# Patient Record
Sex: FEMALE | Race: WHITE | NOT HISPANIC OR LATINO | Employment: FULL TIME | URBAN - METROPOLITAN AREA
[De-identification: names, ages, dates, MRNs, and addresses within clinical notes are randomized per-mention and may not be internally consistent; named-entity substitution may affect disease eponyms.]

---

## 2023-04-20 LAB
ALANINE AMINOTRANSFERASE (SGPT) (U/L) IN SER/PLAS: 31 U/L (ref 7–45)
ALBUMIN (G/DL) IN SER/PLAS: 3.8 G/DL (ref 3.4–5)
ALKALINE PHOSPHATASE (U/L) IN SER/PLAS: 83 U/L (ref 33–110)
ANION GAP IN SER/PLAS: 12 MMOL/L (ref 10–20)
ASPARTATE AMINOTRANSFERASE (SGOT) (U/L) IN SER/PLAS: 20 U/L (ref 9–39)
BILIRUBIN TOTAL (MG/DL) IN SER/PLAS: 0.3 MG/DL (ref 0–1.2)
CALCIDIOL (25 OH VITAMIN D3) (NG/ML) IN SER/PLAS: 71 NG/ML
CALCIUM (MG/DL) IN SER/PLAS: 8.9 MG/DL (ref 8.6–10.6)
CARBON DIOXIDE, TOTAL (MMOL/L) IN SER/PLAS: 27 MMOL/L (ref 21–32)
CHLORIDE (MMOL/L) IN SER/PLAS: 103 MMOL/L (ref 98–107)
CHOLESTEROL (MG/DL) IN SER/PLAS: 178 MG/DL (ref 0–199)
CHOLESTEROL IN HDL (MG/DL) IN SER/PLAS: 35.5 MG/DL
CHOLESTEROL/HDL RATIO: 5
COBALAMIN (VITAMIN B12) (PG/ML) IN SER/PLAS: 428 PG/ML (ref 211–911)
CREATININE (MG/DL) IN SER/PLAS: 0.59 MG/DL (ref 0.5–1.05)
ESTIMATED AVERAGE GLUCOSE FOR HBA1C: 126 MG/DL
GFR FEMALE: >90 ML/MIN/1.73M2
GLUCOSE (MG/DL) IN SER/PLAS: 101 MG/DL (ref 74–99)
HEMOGLOBIN A1C/HEMOGLOBIN TOTAL IN BLOOD: 6 %
INSULIN, FASTING: 32 UIU/ML (ref 3–25)
LDL: 114 MG/DL (ref 0–99)
POTASSIUM (MMOL/L) IN SER/PLAS: 4.3 MMOL/L (ref 3.5–5.3)
PROTEIN TOTAL: 7.3 G/DL (ref 6.4–8.2)
SODIUM (MMOL/L) IN SER/PLAS: 138 MMOL/L (ref 136–145)
TRIGLYCERIDE (MG/DL) IN SER/PLAS: 142 MG/DL (ref 0–149)
UREA NITROGEN (MG/DL) IN SER/PLAS: 11 MG/DL (ref 6–23)
VLDL: 28 MG/DL (ref 0–40)

## 2023-05-16 ENCOUNTER — OFFICE VISIT (OUTPATIENT)
Dept: PRIMARY CARE | Facility: CLINIC | Age: 43
End: 2023-05-16
Payer: COMMERCIAL

## 2023-05-16 VITALS
DIASTOLIC BLOOD PRESSURE: 72 MMHG | SYSTOLIC BLOOD PRESSURE: 116 MMHG | WEIGHT: 236 LBS | BODY MASS INDEX: 43.43 KG/M2 | HEIGHT: 62 IN

## 2023-05-16 DIAGNOSIS — M25.511 STERNOCLAVICULAR JOINT PAIN, RIGHT: Primary | ICD-10-CM

## 2023-05-16 DIAGNOSIS — Z00.00 HEALTHCARE MAINTENANCE: ICD-10-CM

## 2023-05-16 PROBLEM — H66.92 LEFT OTITIS MEDIA: Status: ACTIVE | Noted: 2023-05-16

## 2023-05-16 PROBLEM — H65.111 ACUTE ALLERGIC OTITIS MEDIA OF RIGHT EAR: Status: ACTIVE | Noted: 2023-05-16

## 2023-05-16 PROBLEM — J32.1 FRONTAL SINUSITIS: Status: ACTIVE | Noted: 2023-05-16

## 2023-05-16 PROBLEM — D64.9 ANEMIA: Status: ACTIVE | Noted: 2023-05-16

## 2023-05-16 PROBLEM — D75.839 THROMBOCYTOSIS: Status: ACTIVE | Noted: 2023-05-16

## 2023-05-16 PROBLEM — E78.5 HYPERLIPIDEMIA: Status: ACTIVE | Noted: 2023-05-16

## 2023-05-16 PROBLEM — J32.9 SINUSITIS: Status: ACTIVE | Noted: 2023-05-16

## 2023-05-16 PROBLEM — J30.9 ALLERGIC RHINOSINUSITIS: Status: ACTIVE | Noted: 2023-05-16

## 2023-05-16 PROBLEM — D47.3 ESSENTIAL THROMBOCYTOSIS (MULTI): Status: ACTIVE | Noted: 2023-05-16

## 2023-05-16 PROBLEM — J01.10 ACUTE FRONTAL SINUSITIS: Status: ACTIVE | Noted: 2023-05-16

## 2023-05-16 PROBLEM — J02.9 SORE THROAT: Status: ACTIVE | Noted: 2023-05-16

## 2023-05-16 PROBLEM — J01.90 ACUTE SINUSITIS: Status: ACTIVE | Noted: 2023-05-16

## 2023-05-16 PROCEDURE — 3008F BODY MASS INDEX DOCD: CPT | Performed by: INTERNAL MEDICINE

## 2023-05-16 PROCEDURE — 99213 OFFICE O/P EST LOW 20 MIN: CPT | Performed by: INTERNAL MEDICINE

## 2023-05-16 RX ORDER — SPIRONOLACTONE 100 MG/1
TABLET, FILM COATED ORAL
COMMUNITY
Start: 2015-10-23

## 2023-05-16 RX ORDER — CYPROHEPTADINE HYDROCHLORIDE 4 MG/1
1 TABLET ORAL 2 TIMES DAILY
COMMUNITY
Start: 2018-08-08

## 2023-05-16 RX ORDER — FLUTICASONE PROPIONATE 93 UG/1
SPRAY, METERED NASAL
COMMUNITY
Start: 2018-11-19 | End: 2023-09-28 | Stop reason: ALTCHOICE

## 2023-05-16 RX ORDER — AZELASTINE 1 MG/ML
SPRAY, METERED NASAL
COMMUNITY
Start: 2023-02-27

## 2023-05-16 RX ORDER — MONTELUKAST SODIUM 10 MG/1
10 TABLET ORAL
COMMUNITY

## 2023-05-17 NOTE — PROGRESS NOTES
"Subjective   Patient ID: Barbara Santiaog is a 43 y.o. female who presents for swelling neck.    HPI   Patient presents with complaints of right-sided neck pain and tenderness for last 2 days  Denies any sore throat denies any chest pain  She was working in the yard 3 days ago doing a lot of mulching and lift heavy bags      Past recap  Patient is here for physical  Follow-up on anemia  Also mother passed away so she was under a lot of stress and not eating well and gaining weight  Having hard time losing weight with diet and exercise  Wants some consultation regarding weight loss diet pill        To establish PCP  For physical  Just questions about taking famotidine at night  She was waking up in the middle of the night so her allergist immunologist suggested to take famotidine and it has cut down her mid night awakening and cough     Past medical history: Seasonal allergies, food allergies, immune deficiency, acne, restless leg, tonsillectomy  Social history: Non-smoker nondrinker, high   Family history Father with type 2 diabetes mother with obesity heart failure MRSA infection after spinal surgery, paternal grandmother with breast cancer and osteoporosis   Review of Systems    Objective   /72   Ht 1.575 m (5' 2\")   Wt 107 kg (236 lb)   BMI 43.16 kg/m²     Physical Exam  Vitals reviewed.   Constitutional:       Appearance: Normal appearance.   HENT:      Head: Normocephalic and atraumatic.      Right Ear: Tympanic membrane, ear canal and external ear normal.      Left Ear: Tympanic membrane, ear canal and external ear normal.      Nose: Nose normal.      Mouth/Throat:      Pharynx: Oropharynx is clear.   Eyes:      Extraocular Movements: Extraocular movements intact.      Conjunctiva/sclera: Conjunctivae normal.      Pupils: Pupils are equal, round, and reactive to light.   Cardiovascular:      Rate and Rhythm: Normal rate and regular rhythm.      Pulses: Normal pulses.      " Heart sounds: Normal heart sounds.   Pulmonary:      Effort: Pulmonary effort is normal.      Breath sounds: Normal breath sounds.   Abdominal:      General: Abdomen is flat. Bowel sounds are normal.      Palpations: Abdomen is soft.   Musculoskeletal:         General: Tenderness present.      Cervical back: Normal range of motion and neck supple.      Comments: Tenderness at the sternoclavicular joint on right side   Skin:     General: Skin is warm and dry.   Neurological:      General: No focal deficit present.      Mental Status: She is alert and oriented to person, place, and time.   Psychiatric:         Mood and Affect: Mood normal.         Assessment/Plan   Problem List Items Addressed This Visit    None  Visit Diagnoses       Sternoclavicular joint pain, right    -  Primary    Healthcare maintenance        Relevant Orders    CT cardiac scoring wo IV contrast                Past recap  Physical normal  Routine blood work ordered  CT cardiac scoring ordered to assess the risk factors  Discussed lifestyle modification to help with the symptoms of GERD  Iron studies and B12 ordered for anemia  Patient is under care of Dr. Albert for acne  She is under care of allergist for immune deficiency  Again discussed different options for weight loss strategies diet exercise  Will refer to weight management clinic  Discussed possibility of sleep apnea causing the symptoms patient wants to lose weight first and see if she still needs sleep study     5/16/2023  Patient has tenderness in sternoclavicular joint  I think patient pulled it and sprained it from doing a lot of physical yard work  Take Motrin and ice it and use heat CT cardiac scoring ordered to assess cardiac risk  Follow-up if not better

## 2023-09-28 ENCOUNTER — LAB (OUTPATIENT)
Dept: LAB | Facility: LAB | Age: 43
End: 2023-09-28
Payer: COMMERCIAL

## 2023-09-28 ENCOUNTER — OFFICE VISIT (OUTPATIENT)
Dept: PRIMARY CARE | Facility: CLINIC | Age: 43
End: 2023-09-28
Payer: COMMERCIAL

## 2023-09-28 VITALS
BODY MASS INDEX: 41.96 KG/M2 | DIASTOLIC BLOOD PRESSURE: 58 MMHG | WEIGHT: 228 LBS | SYSTOLIC BLOOD PRESSURE: 122 MMHG | HEIGHT: 62 IN

## 2023-09-28 DIAGNOSIS — Z00.00 HEALTHCARE MAINTENANCE: Primary | ICD-10-CM

## 2023-09-28 DIAGNOSIS — Z00.00 HEALTHCARE MAINTENANCE: ICD-10-CM

## 2023-09-28 PROBLEM — R73.03 PREDIABETES: Status: ACTIVE | Noted: 2023-09-28

## 2023-09-28 PROBLEM — E16.1 HYPERINSULINEMIA: Status: ACTIVE | Noted: 2023-09-28

## 2023-09-28 PROBLEM — R63.2 POLYPHAGIA: Status: ACTIVE | Noted: 2023-09-28

## 2023-09-28 PROBLEM — E88.819 INSULIN RESISTANCE: Status: ACTIVE | Noted: 2023-09-28

## 2023-09-28 PROBLEM — E88.810 METABOLIC SYNDROME: Status: ACTIVE | Noted: 2023-09-28

## 2023-09-28 PROBLEM — E66.01 MORBID OBESITY (MULTI): Status: ACTIVE | Noted: 2023-09-28

## 2023-09-28 LAB
ALANINE AMINOTRANSFERASE (SGPT) (U/L) IN SER/PLAS: 30 U/L (ref 7–45)
ALBUMIN (G/DL) IN SER/PLAS: 3.9 G/DL (ref 3.4–5)
ALKALINE PHOSPHATASE (U/L) IN SER/PLAS: 77 U/L (ref 33–110)
ANION GAP IN SER/PLAS: 15 MMOL/L (ref 10–20)
ASPARTATE AMINOTRANSFERASE (SGOT) (U/L) IN SER/PLAS: 21 U/L (ref 9–39)
BILIRUBIN TOTAL (MG/DL) IN SER/PLAS: 0.4 MG/DL (ref 0–1.2)
CALCIDIOL (25 OH VITAMIN D3) (NG/ML) IN SER/PLAS: 71 NG/ML
CALCIUM (MG/DL) IN SER/PLAS: 9.4 MG/DL (ref 8.6–10.6)
CARBON DIOXIDE, TOTAL (MMOL/L) IN SER/PLAS: 26 MMOL/L (ref 21–32)
CHLORIDE (MMOL/L) IN SER/PLAS: 103 MMOL/L (ref 98–107)
CHOLESTEROL (MG/DL) IN SER/PLAS: 191 MG/DL (ref 0–199)
CHOLESTEROL IN HDL (MG/DL) IN SER/PLAS: 35.9 MG/DL
CHOLESTEROL/HDL RATIO: 5.3
COBALAMIN (VITAMIN B12) (PG/ML) IN SER/PLAS: 501 PG/ML (ref 211–911)
CREATININE (MG/DL) IN SER/PLAS: 0.65 MG/DL (ref 0.5–1.05)
ERYTHROCYTE DISTRIBUTION WIDTH (RATIO) BY AUTOMATED COUNT: 15 % (ref 11.5–14.5)
ERYTHROCYTE MEAN CORPUSCULAR HEMOGLOBIN CONCENTRATION (G/DL) BY AUTOMATED: 29.9 G/DL (ref 32–36)
ERYTHROCYTE MEAN CORPUSCULAR VOLUME (FL) BY AUTOMATED COUNT: 82 FL (ref 80–100)
ERYTHROCYTES (10*6/UL) IN BLOOD BY AUTOMATED COUNT: 5.03 X10E12/L (ref 4–5.2)
ESTIMATED AVERAGE GLUCOSE FOR HBA1C: 120 MG/DL
GFR FEMALE: >90 ML/MIN/1.73M2
GLUCOSE (MG/DL) IN SER/PLAS: 91 MG/DL (ref 74–99)
HEMATOCRIT (%) IN BLOOD BY AUTOMATED COUNT: 41.1 % (ref 36–46)
HEMOGLOBIN (G/DL) IN BLOOD: 12.3 G/DL (ref 12–16)
HEMOGLOBIN A1C/HEMOGLOBIN TOTAL IN BLOOD: 5.8 %
LDL: 134 MG/DL (ref 0–99)
LEUKOCYTES (10*3/UL) IN BLOOD BY AUTOMATED COUNT: 9.6 X10E9/L (ref 4.4–11.3)
NRBC (PER 100 WBCS) BY AUTOMATED COUNT: 0 /100 WBC (ref 0–0)
PLATELETS (10*3/UL) IN BLOOD AUTOMATED COUNT: 531 X10E9/L (ref 150–450)
POTASSIUM (MMOL/L) IN SER/PLAS: 4.5 MMOL/L (ref 3.5–5.3)
PROTEIN TOTAL: 7.7 G/DL (ref 6.4–8.2)
SODIUM (MMOL/L) IN SER/PLAS: 139 MMOL/L (ref 136–145)
THYROTROPIN (MIU/L) IN SER/PLAS BY DETECTION LIMIT <= 0.05 MIU/L: 1.62 MIU/L (ref 0.44–3.98)
TRIGLYCERIDE (MG/DL) IN SER/PLAS: 107 MG/DL (ref 0–149)
UREA NITROGEN (MG/DL) IN SER/PLAS: 11 MG/DL (ref 6–23)
VLDL: 21 MG/DL (ref 0–40)

## 2023-09-28 PROCEDURE — 83036 HEMOGLOBIN GLYCOSYLATED A1C: CPT

## 2023-09-28 PROCEDURE — 80061 LIPID PANEL: CPT

## 2023-09-28 PROCEDURE — 1036F TOBACCO NON-USER: CPT | Performed by: INTERNAL MEDICINE

## 2023-09-28 PROCEDURE — 84443 ASSAY THYROID STIM HORMONE: CPT

## 2023-09-28 PROCEDURE — 82306 VITAMIN D 25 HYDROXY: CPT

## 2023-09-28 PROCEDURE — 99396 PREV VISIT EST AGE 40-64: CPT | Performed by: INTERNAL MEDICINE

## 2023-09-28 PROCEDURE — 3008F BODY MASS INDEX DOCD: CPT | Performed by: INTERNAL MEDICINE

## 2023-09-28 PROCEDURE — 36415 COLL VENOUS BLD VENIPUNCTURE: CPT

## 2023-09-28 PROCEDURE — 82607 VITAMIN B-12: CPT

## 2023-09-28 PROCEDURE — 80053 COMPREHEN METABOLIC PANEL: CPT

## 2023-09-28 PROCEDURE — 85027 COMPLETE CBC AUTOMATED: CPT

## 2023-09-28 NOTE — PROGRESS NOTES
"Subjective   Patient ID: Barbara Santiago is a 43 y.o. female who presents for CPE.    HPI   Patient is here for physical  She has lost 20 pounds     patient presents with complaints of right-sided neck pain and tenderness for last 2 days  Denies any sore throat denies any chest pain  She was working in the yard 3 days ago doing a lot of mulching and lift heavy bags        Past recap  Patient is here for physical  Follow-up on anemia  Also mother passed away so she was under a lot of stress and not eating well and gaining weight  Having hard time losing weight with diet and exercise  Wants some consultation regarding weight loss diet pill        To establish PCP  For physical  Just questions about taking famotidine at night  She was waking up in the middle of the night so her allergist immunologist suggested to take famotidine and it has cut down her mid night awakening and cough     Past medical history: Seasonal allergies, food allergies, immune deficiency, acne, restless leg, tonsillectomy  Social history: Non-smoker nondrinker, high   Family history Father with type 2 diabetes mother with obesity heart failure MRSA infection after spinal surgery, paternal grandmother with breast cancer and osteoporosis   Review of Systems    Objective   /58   Ht 1.575 m (5' 2\")   Wt 103 kg (228 lb)   BMI 41.70 kg/m²     Physical Exam  Vitals reviewed.   Constitutional:       Appearance: Normal appearance.   HENT:      Head: Normocephalic and atraumatic.      Right Ear: Tympanic membrane, ear canal and external ear normal.      Left Ear: Tympanic membrane, ear canal and external ear normal.      Nose: Nose normal.      Mouth/Throat:      Pharynx: Oropharynx is clear.   Eyes:      Extraocular Movements: Extraocular movements intact.      Conjunctiva/sclera: Conjunctivae normal.      Pupils: Pupils are equal, round, and reactive to light.   Cardiovascular:      Rate and Rhythm: Normal rate and " regular rhythm.      Pulses: Normal pulses.      Heart sounds: Normal heart sounds.   Pulmonary:      Effort: Pulmonary effort is normal.      Breath sounds: Normal breath sounds.   Abdominal:      General: Abdomen is flat. Bowel sounds are normal.      Palpations: Abdomen is soft.   Musculoskeletal:      Cervical back: Normal range of motion and neck supple.   Skin:     General: Skin is warm and dry.   Neurological:      General: No focal deficit present.      Mental Status: She is alert and oriented to person, place, and time.   Psychiatric:         Mood and Affect: Mood normal.       Assessment/Plan   Problem List Items Addressed This Visit             ICD-10-CM       Health Encounters    Healthcare maintenance - Primary Z00.00    Relevant Orders    CBC (Completed)    Comprehensive Metabolic Panel (Completed)    Hemoglobin A1C (Completed)    Lipid Panel (Completed)    Vitamin B12 (Completed)    Vitamin D 25-Hydroxy,Total (for eval of Vitamin D levels) (Completed)    TSH with reflex to Free T4 if abnormal (Completed)    CT cardiac scoring wo IV contrast     Past recap  Physical normal  Routine blood work ordered  CT cardiac scoring ordered to assess the risk factors  Discussed lifestyle modification to help with the symptoms of GERD  Iron studies and B12 ordered for anemia  Patient is under care of Dr. Albert for acne  She is under care of allergist for immune deficiency  Again discussed different options for weight loss strategies diet exercise  Will refer to weight management clinic  Discussed possibility of sleep apnea causing the symptoms patient wants to lose weight first and see if she still needs sleep study     5/16/2023  Patient has tenderness in sternoclavicular joint  I think patient pulled it and sprained it from doing a lot of physical yard work  Take Motrin and ice it and use heat CT cardiac scoring ordered to assess cardiac risk  Follow-up if not better    9/28/2023  Physical normal  Routine blood  work ordered  CT cardiac scoring ordered  Discussed diet exercise

## 2023-10-17 ENCOUNTER — OFFICE VISIT (OUTPATIENT)
Dept: PRIMARY CARE | Facility: CLINIC | Age: 43
End: 2023-10-17
Payer: COMMERCIAL

## 2023-10-17 VITALS
BODY MASS INDEX: 45.27 KG/M2 | DIASTOLIC BLOOD PRESSURE: 56 MMHG | SYSTOLIC BLOOD PRESSURE: 120 MMHG | HEIGHT: 62 IN | WEIGHT: 246 LBS

## 2023-10-17 DIAGNOSIS — E78.49 OTHER HYPERLIPIDEMIA: ICD-10-CM

## 2023-10-17 PROCEDURE — 1036F TOBACCO NON-USER: CPT | Performed by: INTERNAL MEDICINE

## 2023-10-17 PROCEDURE — 99213 OFFICE O/P EST LOW 20 MIN: CPT | Performed by: INTERNAL MEDICINE

## 2023-10-17 PROCEDURE — 3008F BODY MASS INDEX DOCD: CPT | Performed by: INTERNAL MEDICINE

## 2023-10-17 RX ORDER — CHOLECALCIFEROL (VITAMIN D3) 125 MCG
CAPSULE ORAL
COMMUNITY

## 2023-10-17 NOTE — PROGRESS NOTES
"Subjective   Patient ID: Barbara Santiago is a 43 y.o. female who presents for Results.    HPI   Patient is here for follow-up on blood work  Did not get CT cardiac scoring done     patient is here for physical  She has lost 20 pounds     patient presents with complaints of right-sided neck pain and tenderness for last 2 days  Denies any sore throat denies any chest pain  She was working in the yard 3 days ago doing a lot of mulching and lift heavy bags        Past recap  Patient is here for physical  Follow-up on anemia  Also mother passed away so she was under a lot of stress and not eating well and gaining weight  Having hard time losing weight with diet and exercise  Wants some consultation regarding weight loss diet pill        To establish PCP  For physical  Just questions about taking famotidine at night  She was waking up in the middle of the night so her allergist immunologist suggested to take famotidine and it has cut down her mid night awakening and cough     Past medical history: Seasonal allergies, food allergies, immune deficiency, acne, restless leg, tonsillectomy  Social history: Non-smoker nondrinker, high   Family history Father with type 2 diabetes mother with obesity heart failure MRSA infection after spinal surgery, paternal grandmother with breast cancer and osteoporosis   Review of Systems    Objective   /56   Ht 1.575 m (5' 2\")   Wt 112 kg (246 lb)   BMI 44.99 kg/m²     Physical Exam  Vitals reviewed.   Constitutional:       Appearance: Normal appearance.   HENT:      Head: Normocephalic and atraumatic.      Right Ear: Tympanic membrane, ear canal and external ear normal.      Left Ear: Tympanic membrane, ear canal and external ear normal.      Nose: Nose normal.      Mouth/Throat:      Pharynx: Oropharynx is clear.   Eyes:      Extraocular Movements: Extraocular movements intact.      Conjunctiva/sclera: Conjunctivae normal.      Pupils: Pupils are equal, " round, and reactive to light.   Cardiovascular:      Rate and Rhythm: Normal rate and regular rhythm.      Pulses: Normal pulses.      Heart sounds: Normal heart sounds.   Pulmonary:      Effort: Pulmonary effort is normal.      Breath sounds: Normal breath sounds.   Abdominal:      General: Abdomen is flat. Bowel sounds are normal.      Palpations: Abdomen is soft.   Musculoskeletal:      Cervical back: Normal range of motion and neck supple.   Skin:     General: Skin is warm and dry.   Neurological:      General: No focal deficit present.      Mental Status: She is alert and oriented to person, place, and time.   Psychiatric:         Mood and Affect: Mood normal.         Assessment/Plan   Problem List Items Addressed This Visit             ICD-10-CM       Cardiac and Vasculature    Hyperlipidemia E78.5    Relevant Orders    Lipid panel   Past recap  Physical normal  Routine blood work ordered  CT cardiac scoring ordered to assess the risk factors  Discussed lifestyle modification to help with the symptoms of GERD  Iron studies and B12 ordered for anemia  Patient is under care of Dr. Albert for acne  She is under care of allergist for immune deficiency  Again discussed different options for weight loss strategies diet exercise  Will refer to weight management clinic  Discussed possibility of sleep apnea causing the symptoms patient wants to lose weight first and see if she still needs sleep study     5/16/2023  Patient has tenderness in sternoclavicular joint  I think patient pulled it and sprained it from doing a lot of physical yard work  Take Motrin and ice it and use heat CT cardiac scoring ordered to assess cardiac risk  Follow-up if not better    9/28/2023  Physical normal  Routine blood work ordered  CT cardiac scoring ordered  Discussed diet exercise    10/17/2023  Blood work reviewed  Cholesterol is high  Patient does not want to go cholesterol medication yet  Wants to try diet and exercise  Cholesterol  diet chart given  Follow-up blood work in 6 months

## 2023-11-03 ENCOUNTER — HOSPITAL ENCOUNTER (OUTPATIENT)
Dept: RADIOLOGY | Facility: HOSPITAL | Age: 43
Discharge: HOME | End: 2023-11-03
Payer: COMMERCIAL

## 2023-11-03 DIAGNOSIS — Z00.00 HEALTHCARE MAINTENANCE: ICD-10-CM

## 2023-11-03 PROCEDURE — 75571 CT HRT W/O DYE W/CA TEST: CPT

## 2024-04-16 ENCOUNTER — OFFICE VISIT (OUTPATIENT)
Dept: INFECTIOUS DISEASES | Facility: CLINIC | Age: 44
End: 2024-04-16
Payer: COMMERCIAL

## 2024-04-16 DIAGNOSIS — Z71.84 COUNSELING FOR TRAVEL: Primary | ICD-10-CM

## 2024-04-16 PROCEDURE — 90690 TYPHOID VACCINE ORAL: CPT

## 2024-04-16 PROCEDURE — 99202U03 TRAVEL CONSULT (U03): Performed by: STUDENT IN AN ORGANIZED HEALTH CARE EDUCATION/TRAINING PROGRAM

## 2024-04-16 RX ORDER — BISMUTH SUBSALICYLATE 262 MG
1 TABLET,CHEWABLE ORAL DAILY
COMMUNITY

## 2024-04-16 RX ORDER — FAMOTIDINE 10 MG/1
10 TABLET ORAL NIGHTLY PRN
COMMUNITY

## 2024-04-16 NOTE — PATIENT INSTRUCTIONS
You were seen today for your upcoming trip.    For your country specific issues, please refer back to the [employer] handouts supplied to you in the travel brochure folder that we provided to you at your visit.  These are updated daily, if necessary, by the reporting agencies, so are a valuable source of information for your trip.    This brief summary may not contain all of the items that we discussed today.  Please review the handouts given to you as well.    ** Please be sure to carry any medications with you in your carry-on luggage in the event that your luggage is delayed or lost during your travels.    ** For your trip, as we discussed, standard food and water precautions apply.     You should avoid drinking any water that is not bottled.  Alcoholic or carbonated beverages are safe to drink.  Any beverage that has been brought to a rolling boil for a minimum of 30 seconds is also safe to drink (once it has cooled some).  Commercially purchased milk products that are boxed (may be on store shelves) or refrigerated, are pasteurized and therefore safe to drink as long as they are refrigerated after opening.  Juices that are prepared commercially (in boxes or individual bottles) are also safe to drink as they are pasteurized as well.  Avoid road-side juice vendors as the juice may be diluted with contaminated water or produced from unclean fruit.  Regarding food precautions, you want to make sure that meats are well cooked.   Avoid eating fresh fruits and vegetables raw with the skin intact.  Peel before eating.  Avoid salads due to possible contamination by contaminated water.  Avoid any unpasteurized cheeses (they typically are very white in appearance)    ** We recommend that you use insect repellant to help you prevent infections caused by biting insects such as mosquitoes, flies, ticks, fleas and chiggers.  This includes protection against Zika virus, Dengue virus, Chikungunya and Malaria, just to name a  "few.    For insect repellents that are applied directly to the skin, we recommend DEET containing repellants with a percentage between 20-40%.  Apply to skin exposed surfaces only, every 6-8 hours throughout the day.  I typically recommend applying before breakfast, lunch and dinner as these are natural breaks in your day.  Wash your hands after applying. Apply again in the evening.  You must reapply after bathing or swimming as it is washed away with water.  Apply after sunscreen products are applied. DEET containing repellants protect against all concerning insects with the exception of hornets, wasps or bees. Activity against ticks only lasts for 2 hours. For additional Tick precautions while hiking, tuck your pant legs into your socks as this will prevent ticks from crawling up your shoes / socks onto your legs while walking. Perform a \"tick check\" at least once daily, at the end of your day.  Check in the sporting goods areas of most stores for these products.  A recommended alternative, Picardin ,may also be used every 8 hours.  If you are unable to locate the product in stores, try on-line stores as well.      PERMETHRIN SPRAY  is an additional option and is for application to clothing and garments only.  This can be applied to hats, bandanas, socks, boots and any clothing items to prevent insect attention.  I can be applied before you leave and will remain active through many washes and for up to 6 weeks in unwashed clothing.  Read any packaging for full specifications. Apply in an open area as when wet, it has an odor. Once dry, it typically has no odor and does not stain clothing. Regular repellants should be applied to exposed skin however.  Permethrin may only be available on-line.     **  As a general safety procedure, we recommend that you scan a copy of your passport, any travel required documents (yellow fever vaccine card), and itinerary and email them to yourself.  Keep these in a special travel " folder for reference in the event that your travel documents are misplaced or stolen while abroad.  You should also leave a detailed copy of your itinerary with a friend or relative at home for reference as well.  If traveling for long periods, an international SIM card or global plan for your cellular service may be beneficial for communication. This list is not meant to be all inclusive.      **  Please review and understand any cultural aspects of the countries that you will be visiting to ensure that appropriate dress and behaviors are understood.  ENJOY YOUR TRIP (o:      **  Make sure to come back to complete any vaccine series that may have been started today.  This will ensure full vaccine efficacy and protection for future travel.

## 2024-04-16 NOTE — PROGRESS NOTES
Traveling to [facility] ([facility] [place] ([facility]) from 5/12-5/24/24.    Will be staying in the capital cities which are not in the malaria transmission areas. No Malarone necessary for this trip. No Yellow Fever in these areas either.    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    Ordered oral Typhoid vaccine.    Offered azithromycin but pt has numerous allergies and frequent illnesses (sinus infections) and will get antibiotics from her immunologist.    Discussed food, water, and insect precautions.

## 2024-04-16 NOTE — LETTER
04/17/24    Valentina Carrasquillo MD  [address]  [facility]  [facility] [address]      Dear Dr. Valentina Carrasquillo MD,    I am writing to confirm that your patient, Barbara Santiago, received care in my office on 04/17/24. I have enclosed a summary of the care provided to Barbara for your reference.    Please contact me with any questions you may have regarding the visit.    Sincerely,         Lorena Ronquillo PA-C  [id number]  [place] [address]  [facility] [address]    CC: No Recipients

## 2024-09-26 ENCOUNTER — HOSPITAL ENCOUNTER (OUTPATIENT)
Dept: RADIOLOGY | Facility: CLINIC | Age: 44
Discharge: HOME | End: 2024-09-26
Payer: COMMERCIAL

## 2024-09-26 ENCOUNTER — OFFICE VISIT (OUTPATIENT)
Dept: PRIMARY CARE | Facility: CLINIC | Age: 44
End: 2024-09-26
Payer: COMMERCIAL

## 2024-09-26 VITALS
HEIGHT: 62 IN | SYSTOLIC BLOOD PRESSURE: 128 MMHG | DIASTOLIC BLOOD PRESSURE: 68 MMHG | BODY MASS INDEX: 44.35 KG/M2 | WEIGHT: 241 LBS

## 2024-09-26 DIAGNOSIS — R20.0 ARM NUMBNESS: ICD-10-CM

## 2024-09-26 DIAGNOSIS — M54.2 NECK PAIN: ICD-10-CM

## 2024-09-26 PROCEDURE — 99214 OFFICE O/P EST MOD 30 MIN: CPT | Performed by: INTERNAL MEDICINE

## 2024-09-26 PROCEDURE — 3008F BODY MASS INDEX DOCD: CPT | Performed by: INTERNAL MEDICINE

## 2024-09-26 PROCEDURE — 72050 X-RAY EXAM NECK SPINE 4/5VWS: CPT

## 2024-09-26 RX ORDER — NABUMETONE 750 MG/1
750 TABLET, FILM COATED ORAL 2 TIMES DAILY
Qty: 60 TABLET | Refills: 0 | Status: SHIPPED | OUTPATIENT
Start: 2024-09-26 | End: 2024-10-26

## 2024-09-26 RX ORDER — CYCLOBENZAPRINE HCL 10 MG
10 TABLET ORAL 2 TIMES DAILY PRN
Qty: 60 TABLET | Refills: 0 | Status: SHIPPED | OUTPATIENT
Start: 2024-09-26 | End: 2024-11-25

## 2024-09-26 NOTE — PROGRESS NOTES
"Subjective   Patient ID: Barbara Santiago is a 44 y.o. female who presents for Follow-up.    HPI   Patient is complaining of pain in shoulder and neck radiating to the left arm.  Lost  strength for last few days having decreased sensation  She does work with computer a lot     patient is here for follow-up on blood work  Did not get CT cardiac scoring done     patient is here for physical  She has lost 20 pounds     patient presents with complaints of right-sided neck pain and tenderness for last 2 days  Denies any sore throat denies any chest pain  She was working in the yard 3 days ago doing a lot of mulching and lift heavy bags        Past recap  Patient is here for physical  Follow-up on anemia  Also mother passed away so she was under a lot of stress and not eating well and gaining weight  Having hard time losing weight with diet and exercise  Wants some consultation regarding weight loss diet pill        To establish PCP  For physical  Just questions about taking famotidine at night  She was waking up in the middle of the night so her allergist immunologist suggested to take famotidine and it has cut down her mid night awakening and cough     Past medical history: Seasonal allergies, food allergies, immune deficiency, acne, restless leg, tonsillectomy  Social history: Non-smoker nondrinker, high   Family history Father with type 2 diabetes mother with obesity heart failure MRSA infection after spinal surgery, paternal grandmother with breast cancer and osteoporosis   Review of Systems    Objective   /68   Ht 1.575 m (5' 2\")   Wt 109 kg (241 lb)   BMI 44.08 kg/m²     Physical Exam  Vitals reviewed.   Constitutional:       Appearance: Normal appearance.   HENT:      Head: Normocephalic and atraumatic.      Right Ear: Tympanic membrane, ear canal and external ear normal.      Left Ear: Tympanic membrane, ear canal and external ear normal.      Nose: Nose normal.      " Mouth/Throat:      Pharynx: Oropharynx is clear.   Eyes:      Extraocular Movements: Extraocular movements intact.      Conjunctiva/sclera: Conjunctivae normal.      Pupils: Pupils are equal, round, and reactive to light.   Cardiovascular:      Rate and Rhythm: Normal rate and regular rhythm.      Pulses: Normal pulses.      Heart sounds: Normal heart sounds.   Pulmonary:      Effort: Pulmonary effort is normal.      Breath sounds: Normal breath sounds.   Abdominal:      General: Abdomen is flat. Bowel sounds are normal.      Palpations: Abdomen is soft.   Musculoskeletal:         General: Tenderness present.      Cervical back: Normal range of motion and neck supple.      Comments: Tenderness in neck muscles and posterior shoulder muscle   Skin:     General: Skin is warm and dry.   Neurological:      General: No focal deficit present.      Mental Status: She is alert and oriented to person, place, and time.   Psychiatric:         Mood and Affect: Mood normal.         Assessment/Plan   Problem List Items Addressed This Visit             ICD-10-CM       Musculoskeletal and Injuries    Neck pain M54.2    Relevant Medications    nabumetone (Relafen) 750 mg tablet    cyclobenzaprine (Flexeril) 10 mg tablet    Other Relevant Orders    Referral to Physical Therapy     Other Visit Diagnoses         Codes    Arm numbness     R20.0    Relevant Orders    EMG & nerve conduction          Past recap  Physical normal  Routine blood work ordered  CT cardiac scoring ordered to assess the risk factors  Discussed lifestyle modification to help with the symptoms of GERD  Iron studies and B12 ordered for anemia  Patient is under care of Dr. Albert for acne  She is under care of allergist for immune deficiency  Again discussed different options for weight loss strategies diet exercise  Will refer to weight management clinic  Discussed possibility of sleep apnea causing the symptoms patient wants to lose weight first and see if she  still needs sleep study     5/16/2023  Patient has tenderness in sternoclavicular joint  I think patient pulled it and sprained it from doing a lot of physical yard work  Take Motrin and ice it and use heat CT cardiac scoring ordered to assess cardiac risk  Follow-up if not better    9/28/2023  Physical normal  Routine blood work ordered  CT cardiac scoring ordered  Discussed diet exercise    10/17/2023  Blood work reviewed  Cholesterol is high  Patient does not want to go cholesterol medication yet  Wants to try diet and exercise  Cholesterol diet chart given  Follow-up blood work in 6 months    9/26/2024  Will do x-ray of C-spine  Wondering if patient is having cervical radiculopathy  EMG of the left arm  Medrol Dosepak  Anti-inflammatory and muscle relaxant  Physical therapy  Follow-up if not better

## 2024-10-02 ENCOUNTER — EVALUATION (OUTPATIENT)
Dept: PHYSICAL THERAPY | Facility: CLINIC | Age: 44
End: 2024-10-02
Payer: COMMERCIAL

## 2024-10-02 DIAGNOSIS — M54.2 NECK PAIN: Primary | ICD-10-CM

## 2024-10-02 PROCEDURE — 97140 MANUAL THERAPY 1/> REGIONS: CPT | Mod: GP | Performed by: PHYSICAL THERAPIST

## 2024-10-02 PROCEDURE — 97110 THERAPEUTIC EXERCISES: CPT | Mod: GP | Performed by: PHYSICAL THERAPIST

## 2024-10-02 PROCEDURE — 97161 PT EVAL LOW COMPLEX 20 MIN: CPT | Mod: GP | Performed by: PHYSICAL THERAPIST

## 2024-10-02 SDOH — ECONOMIC STABILITY: FOOD INSECURITY: WITHIN THE PAST 12 MONTHS, YOU WORRIED THAT YOUR FOOD WOULD RUN OUT BEFORE YOU GOT MONEY TO BUY MORE.: NEVER TRUE

## 2024-10-02 SDOH — ECONOMIC STABILITY: FOOD INSECURITY: WITHIN THE PAST 12 MONTHS, THE FOOD YOU BOUGHT JUST DIDN'T LAST AND YOU DIDN'T HAVE MONEY TO GET MORE.: NEVER TRUE

## 2024-10-02 ASSESSMENT — PAIN - FUNCTIONAL ASSESSMENT: PAIN_FUNCTIONAL_ASSESSMENT: 0-10

## 2024-10-02 ASSESSMENT — COLUMBIA-SUICIDE SEVERITY RATING SCALE - C-SSRS
1. IN THE PAST MONTH, HAVE YOU WISHED YOU WERE DEAD OR WISHED YOU COULD GO TO SLEEP AND NOT WAKE UP?: NO
6. HAVE YOU EVER DONE ANYTHING, STARTED TO DO ANYTHING, OR PREPARED TO DO ANYTHING TO END YOUR LIFE?: NO
2. HAVE YOU ACTUALLY HAD ANY THOUGHTS OF KILLING YOURSELF?: NO

## 2024-10-02 ASSESSMENT — PAIN DESCRIPTION - DESCRIPTORS: DESCRIPTORS: ACHING;DULL;SHARP

## 2024-10-02 ASSESSMENT — PATIENT HEALTH QUESTIONNAIRE - PHQ9
1. LITTLE INTEREST OR PLEASURE IN DOING THINGS: NOT AT ALL
2. FEELING DOWN, DEPRESSED OR HOPELESS: NOT AT ALL
SUM OF ALL RESPONSES TO PHQ9 QUESTIONS 1 AND 2: 0

## 2024-10-02 ASSESSMENT — ENCOUNTER SYMPTOMS
OCCASIONAL FEELINGS OF UNSTEADINESS: 0
DEPRESSION: 0
LOSS OF SENSATION IN FEET: 0

## 2024-10-02 ASSESSMENT — LIFESTYLE VARIABLES: HOW OFTEN DO YOU HAVE A DRINK CONTAINING ALCOHOL: NEVER

## 2024-10-02 ASSESSMENT — ACTIVITIES OF DAILY LIVING (ADL): ADL_ASSISTANCE: INDEPENDENT

## 2024-10-02 ASSESSMENT — PAIN SCALES - GENERAL: PAINLEVEL_OUTOF10: 3

## 2024-10-03 ENCOUNTER — APPOINTMENT (OUTPATIENT)
Dept: PRIMARY CARE | Facility: CLINIC | Age: 44
End: 2024-10-03
Payer: COMMERCIAL

## 2024-10-03 NOTE — PROGRESS NOTES
Physical Therapy  Physical Therapy Orthopedic Evaluation    Patient Name: Barbara Santiago  MRN: [id number]  Today's Date: 10/2/2024    Insurance:  Payor: [employer] [facility] / Plan: [employer] / Product Type: *No Product type* /   Number of Treatments Authorized: 1/40          Current Problem  Problem List Items Addressed This Visit             ICD-10-CM    Neck pain - Primary M54.2    Relevant Orders    Follow Up In Physical Therapy       General:  General  Reason for Referral: Cervicalgia  Referred By: Dr. ROBINSON Carrasquillo  General Comment: Patient states that has been getting pain in her posterior neck and shoulder. Notes that after a little time, she started to get pain that traveled down her L arm and into the outside of her forearm. Loss of minor sensation along the wrist flexors. Feels as though she has lost  strength as well. Has had this tight muscle before but normally goes away after a few days.      Precautions:   Precautions  STEADI Fall Risk Score (The score of 4 or more indicates an increased risk of falling): 0  Precautions Comment: None    Medical History Form: Reviewed (scanned into chart)    Subjective:   Subjective       Pain:  Pain Assessment: 0-10  0-10 (Numeric) Pain Score: 3 (6/10 highest more in the morning.)  Pain Type: Acute pain  Pain Location: Neck  Pain Orientation: Left, Posterior  Pain Radiating Towards: L scapular and posterior UE, numbness along palmar forearm  Pain Descriptors: Aching, Dull, Sharp  Pain Frequency: Constant/continuous  Pain Onset: Awakened from sleep  Clinical Progression: Gradually worsening  Effect of Pain on Daily Activities: Difficulty sleeping, sitting at a desk, gardening  Patient's Stated Pain Goal: No pain  Pain Interventions: Medication (See MAR), Home medication, Cold applied (Tiger Balm patches)  Response to Interventions: Minor improvement    Relevant Information (PMH & Previous Tests/Imaging): x-ray  Previous Interventions/Treatments:  None    Prior Level of Function (PLOF)  Prior Function Per Pt/Caregiver Report  Level of [place]: Independent with ADLs and functional transfers, Independent with homemaking with ambulation  ADL Assistance: Independent  Homemaking Assistance: Independent  Ambulatory Assistance: Independent  Vocational: Full time employment (Higher Education)  Leisure: Gardening  Hand Dominance: Right  Prior Function Comments: No prior pain  Patient previously independent with all ADLs    Patients Living Environment: lives alone       Primary Language: English    Red Flags: Do you have any of the following? No  Fever/chills, unexplained weight changes, dizziness/fainting, unexplained change in bowel or bladder functions, unexplained malaise or muscle weakness, night pain/sweats, numbness or tingling    Objective     Cervical Spine          C-Spine Observation  Cervical Posture: Fwd head  Shoulder/Scapular/Rib Position : Protracted scapula  C-Spine Observation Comment: Multi joint hypermobility throughout body. Formal Beighton score not assessed    C-Spine Palpation/Joint Mobility Assessment  C-Spine Palpation/Joint Mobility Assessment:: TTP L cervical paraspinals, Reduced PA glides C6-T1  Cervical AROM   Cervical flexion: (80°): 42  Cervical extension: (50°): 50  Cervical rotation right: (80°): 71  Cervical rotation left: (80°): 64  Cervical sidebend right: (45°): 25  Cervical sidebend left: (45°): 31  Shoulder AROM   R shoulder flexion: (180°): 180  L shoulder flexion: (180°): 180  R shoulder abduction: (180°): 170  L shoulder abduction: (180°): 151  R shoulder ER: (90°): T 4  L shoulder ER: (90°): T3  R shoulder IR: (70°): T6  L shoulder IR: (70°): T8  Cervical Strength   Longus coli strength:  (Normal Values Male normal 38.9 seconds; Female 29.4 seconds): Fair  Myotomes (MMT)   R Shoulder Abduction (C5 ): (5/5): 5/5  L Shoulder Abduction (C5 ): (5/5): 4+/5  R Elbow Flexion (C6 ): (5/5): 5-/5  L Elbow Flexion (C6 ): (5/5):  5-/5  Scapular (MMT)   R rhomboids: (5/5): 4+/5  L rhomboids: (5/5): 4/5    Special Tests   Transverse ligament: (Negative): Negative  Alar ligament R: (Negative): Negative  Alar ligament L: (Negative): Negative  Cervical rotation < 60 degrees : Negative  Spurling’s Test: (Negative): Positive on L  Cervical Distraction Test: (Negative): Positive for centralization  Median Nerve ULTT: (Negative): Positive for tightness  Other:  Strength (R,L): Lvl 2 80 lbs, 41 lbs;  Lvl 4 62 lbs 31 lbs    Outcome Measures:    Other Measures  Neck Disability Index: 7    EDUCATION: home exercise program, plan of care, activity modifications, pain management, and injury pathology  Outpatient Education  Individual(s) Educated: Patient  Education Provided: Anatomy, Home Exercise Program, Physiology, POC, Posture  Risk and Benefits Discussed with Patient/Caregiver/Other: yes  Patient/Caregiver Demonstrated Understanding: yes  Plan of Care Discussed and Agreed Upon: yes  Patient Response to Education: Patient/Caregiver Verbalized Understanding of Information, Patient/Caregiver Performed Return Demonstration of Exercises/Activities, Patient/Caregiver Asked Appropriate Questions  Education Comment: Access Code: [email or web address]  URL: [email or web address]  Date: 10/02/2024  Prepared by: Heriberto Us    Exercises  - Supine Chin Tuck  - 5-7 x daily - 7 x weekly - 1 sets - 15 reps  - Standing Cervical Retraction  - 5-7 x daily - 7 x weekly - 1 sets - 15 reps  - Standing Median Nerve Glide  - 2 x daily - 7 x weekly - 1 sets - 10 reps  - Doorway Pec Stretch at 90 Degrees Abduction  - 2 x daily - 7 x weekly - 1 sets - 3 reps - 30-60 sec hold    Assessment:  PT Assessment Results: Decreased strength, Decreased range of motion, Decreased mobility, Pain, Obesity  Assessment Comment: Patient is a 44-year-old female who presents to physical therapy with signs symptoms consistent with cervical spine pain with radiculopathy.  Patient  presents with limited range of motion, reduced upper extremity strength, impaired sensation as well as reduced functional mobility.  These are preventing her from completing ADLs as well as job-related tasks without pain or difficulty.  Therefore she will require skilled physical therapy in order to address stated deficits for full return to pain reduced function.    Clinical Presentation: Stable and/or uncomplicated characteristics  Personal Factors: BMI > 40    Plan:  Treatment/Interventions: Cryotherapy, Dry needling, Education/ Instruction, Electrical stimulation, Hot pack, Manual therapy, Neuromuscular re-education, Self care/ home management, Therapeutic activities, Taping techniques, Therapeutic exercises  PT Plan: Skilled PT  PT Frequency: 2 times per week  Duration: 10 weeks  Onset Date: 09/11/24  Number of Treatments Authorized: 1/40  Rehab Potential: Good  Plan of Care Agreement: Patient      Goals: Set and discussed today  Active       PT Problem       PT Goal 1       Start:  10/02/24    Expected End:  12/11/24       STG  1) Patient will show an improvement in cervical R rotation ROM by 8 degrees in order to allow for improved driving ability in 6 weeks.  2) Patient will be able to perform all daily tasks with pain no greater than a 1 /10 on the VAS in 5 weeks.  3) Patient will be independent with HEP in 3 visits in order to allow for improved function with home and community tasks.    LTG  1) Patient will achieve a NDI score of 1 /50 in order to indicate an improvement in function at home in 10 weeks.  2) Patient will improve scapular strength to 5-/5 in order to reduce compensatory guarding in upper trapezius and greater functional use of upper extremities in 9 weeks.              Patient Stated Goal 1       Start:  10/02/24    Expected End:  12/11/24       Reduce pain of pinched nerve             Plan of care was developed with input and agreement by the patient    Treatments:  Therapeutic  Exercise  Therapeutic Exercise Performed: Yes  Therapeutic Exercise Activity 1: See HEP  Therapeutic Exercise Activity 2: PROM ULTT L median and ulnar nerve    Manual Therapy  Manual Therapy Performed: Yes  Manual Therapy Activity 1: Grade 3 PA glides C4-T2  Manual Therapy Activity 2: STM: L cervical paraspinals in supine  Manual Therapy Activity 3: Grade 2 GHJ distraction in supine    Ambulatory Screenings Summary       Screening  Frequency  Date Last Completed   Spiritual and Cultural Beliefs   Screening each visit or episode of care 10/2/2024   Falls Risk Screening every ambulatory visit [unfilled]   Pain Screening annually at primary care visit  2/24/2020   Domestic Violence screening annually at primary care visit 10/2/2024   Depression Screening annually in the primary care setting 10/2/2024   Suicide Risk Screening annually in the primary care setting 10/2/2024   Nutrition and Food Insecurity   Screening at least annually at primary care visit  10/2/2024   Key Learner annually in the primary care setting 10/2/2024   Drug Screen  9/28/2023  9:08 AM   Alcohol Screen  9/28/2023  9:08 AM   Advance Directive         Time Calculation  Start Time: 0700  Stop Time: 0754  Time Calculation (min): 54 min  PT Evaluation Time Entry  PT Evaluation (Low) Time Entry: 25, PT Therapeutic Procedures Time Entry  Manual Therapy Time Entry: 17  Therapeutic Exercise Time Entry: 10,

## 2024-10-07 ENCOUNTER — TREATMENT (OUTPATIENT)
Dept: PHYSICAL THERAPY | Facility: CLINIC | Age: 44
End: 2024-10-07
Payer: COMMERCIAL

## 2024-10-07 DIAGNOSIS — M54.2 NECK PAIN: ICD-10-CM

## 2024-10-07 PROCEDURE — 97110 THERAPEUTIC EXERCISES: CPT | Mod: GP

## 2024-10-07 NOTE — PROGRESS NOTES
Physical Therapy  Physical Therapy Treatment Note    Patient Name: Barbara Santiago  MRN: [id number]  Today's Date: 10/7/2024  Time Calculation  Start Time: 1755  Stop Time: 1829  Time Calculation (min): 34 min    Insurance:  Number of Treatments Authorized: 2/40        Current Problem  1. Neck pain  Follow Up In Physical Therapy          Precautions  Precautions  Precautions Comment: None (Med screen reviewed in chart - GB)    Subjective   General       Patient reports:  Things seem a bit better.  Seems to be having more neck pain.  Leaving for [employer] and [employer] a the antonio of the month.     Performing HEP?: Yes  C/s ret and nerve glides    Pain   0/10 , mid back sore     Objective    strength 40#s   L Functional IR - pulling in GHJ   Reaching across with L arm and using fingers   C/S Ret/ext = min limit, ERP   C/S L Sbing = min limit  Median nerve glide min limit and produced left hand numbness       Treatments:  Therex:   Sitting with roll x 2 min   C/S ret x 20   C/S ret with self OP x 15 - increase upper back, Improvement in multiple baselinse  C/S ret/ext x 10 - increase, Prodce L forearm, NW    OP EDUCATION:     10/7/24: educated on centralization and proper sx response. Instructed pt to increase frequency of retraction and add self OP to retraction. And to add lumbar roll for proper sitting posture.      Assessment:  Pt has responded well to ret, baselines have improved since eval approx 4 days ago.  Addition of self OP further improved baselines.  Pt may need ret/ext if improvements plateau. Pt demo'd understanding of education/instruction above.          Plan:  F/U with HEP compliance and lumbar roll use.  Reassess ret/ext if progress is slow  Progress function as tolerated.   OP PT Plan  Number of Treatments Authorized: 2/40    Goals:  Active       PT Problem       PT Goal 1       Start:  10/02/24    Expected End:  12/11/24       STG  1) Patient will show an improvement in cervical R rotation ROM  by 8 degrees in order to allow for improved driving ability in 6 weeks.  2) Patient will be able to perform all daily tasks with pain no greater than a 1 /10 on the VAS in 5 weeks.  3) Patient will be independent with HEP in 3 visits in order to allow for improved function with home and community tasks.    LTG  1) Patient will achieve a NDI score of 1 /50 in order to indicate an improvement in function at home in 10 weeks.  2) Patient will improve scapular strength to 5-/5 in order to reduce compensatory guarding in upper trapezius and greater functional use of upper extremities in 9 weeks.              Patient Stated Goal 1       Start:  10/02/24    Expected End:  12/11/24       Reduce pain of pinched nerve              Reynaldo Villatoro, PT

## 2024-10-11 ENCOUNTER — TREATMENT (OUTPATIENT)
Dept: PHYSICAL THERAPY | Facility: CLINIC | Age: 44
End: 2024-10-11
Payer: COMMERCIAL

## 2024-10-11 DIAGNOSIS — M54.2 NECK PAIN: Primary | ICD-10-CM

## 2024-10-11 PROCEDURE — 97140 MANUAL THERAPY 1/> REGIONS: CPT | Mod: GP,CQ

## 2024-10-11 PROCEDURE — 97110 THERAPEUTIC EXERCISES: CPT | Mod: GP,CQ

## 2024-10-11 ASSESSMENT — PAIN SCALES - GENERAL: PAINLEVEL_OUTOF10: 1

## 2024-10-11 ASSESSMENT — PAIN - FUNCTIONAL ASSESSMENT: PAIN_FUNCTIONAL_ASSESSMENT: 0-10

## 2024-10-11 NOTE — PROGRESS NOTES
"  Physical Therapy Treatment    Patient Name: Barbara Santiago  MRN: [id number]  Today's Date: 10/11/2024  Time Calculation  Start Time: 0845  Stop Time: 0933  Time Calculation (min): 48 min  PT Therapeutic Procedures Time Entry  Manual Therapy Time Entry: 20  Therapeutic Exercise Time Entry: 28,      Current Problem  Problem List Items Addressed This Visit             ICD-10-CM    Neck pain - Primary M54.2       Insurance:  Number of Treatments Authorized: 3/40            Subjective   General  Reason for Referral: Cervicalgia  Referred By: Dr. ROBINSON Carrasquillo  General Comment: I think neck is improving.  Notes mild L UE rad sx, but thinks it may be due to sleeping on it.  Increased mildfulness of postural alignment and workplace ergonomics. L UE movement and function are improved.  Her sensation in the L forearm is returning as well.    Performing HEP?: Yes    Precautions  Precautions  STEADI Fall Risk Score (The score of 4 or more indicates an increased risk of falling): 0  Precautions Comment: None (Med screen reviewed in chart - GB)  Pain  Pain Assessment: 0-10  0-10 (Numeric) Pain Score: 1  Pain Type: Acute pain  Pain Location: Neck    Objective       Treatments:    Therapeutic Exercise  Therapeutic Exercise Activity 1: sitting with lumbar roll  Therapeutic Exercise Activity 2: seated cervical retraction with OP x 10  Therapeutic Exercise Activity 3: supine cervical retraction x 10  Therapeutic Exercise Activity 4: supine PVC assisted FF x 2 min  Therapeutic Exercise Activity 5: supine passive pec/bicep stretch 3 x 30\"  Therapeutic Exercise Activity 6: seated cervical retraction x 10  Therapeutic Exercise Activity 7: seated cervical retraction with extension x 5 - challenging.         Manual Therapy  Manual Therapy Activity 1: SOR, manual cervical distraction  Manual Therapy Activity 2: X hand pec release  Manual Therapy Activity 3: gentle L MFR arm pull  Manual Therapy Activity 4: STM L>R occipitals, SCM, scalenes, UT, " LEV                     OP EDUCATION:       Assessment:  PT Assessment  Assessment Comment: Patient continues to respond well to cervical retraction with sx reduction and improved L LE mobility/function.  Focused on postural alignment and soft tissue tension reduction this session.    Plan:  OP PT Plan  Treatment/Interventions: Cryotherapy, Dry needling, Education/ Instruction, Electrical stimulation, Hot pack, Manual therapy, Neuromuscular re-education, Self care/ home management, Therapeutic activities, Taping techniques, Therapeutic exercises  PT Plan: Skilled PT  PT Frequency: 2 times per week  Duration: 10 weeks  Onset Date: 09/11/24  Number of Treatments Authorized: 3/40  Rehab Potential: Good  Plan of Care Agreement: Patient    Goals:  Active       PT Problem       PT Goal 1       Start:  10/02/24    Expected End:  12/11/24       STG  1) Patient will show an improvement in cervical R rotation ROM by 8 degrees in order to allow for improved driving ability in 6 weeks.  2) Patient will be able to perform all daily tasks with pain no greater than a 1 /10 on the VAS in 5 weeks.  3) Patient will be independent with HEP in 3 visits in order to allow for improved function with home and community tasks.    LTG  1) Patient will achieve a NDI score of 1 /50 in order to indicate an improvement in function at home in 10 weeks.  2) Patient will improve scapular strength to 5-/5 in order to reduce compensatory guarding in upper trapezius and greater functional use of upper extremities in 9 weeks.              Patient Stated Goal 1       Start:  10/02/24    Expected End:  12/11/24       Reduce pain of pinched nerve              Brooke Sullivan, PTA

## 2024-10-15 ENCOUNTER — APPOINTMENT (OUTPATIENT)
Dept: PRIMARY CARE | Facility: CLINIC | Age: 44
End: 2024-10-15
Payer: COMMERCIAL

## 2024-10-15 ENCOUNTER — LAB (OUTPATIENT)
Dept: LAB | Facility: LAB | Age: 44
End: 2024-10-15

## 2024-10-15 VITALS
WEIGHT: 240 LBS | HEIGHT: 63 IN | BODY MASS INDEX: 42.52 KG/M2 | DIASTOLIC BLOOD PRESSURE: 64 MMHG | SYSTOLIC BLOOD PRESSURE: 126 MMHG

## 2024-10-15 DIAGNOSIS — Z00.00 PHYSICAL EXAM: ICD-10-CM

## 2024-10-15 DIAGNOSIS — Z23 ENCOUNTER FOR IMMUNIZATION: ICD-10-CM

## 2024-10-15 LAB
25(OH)D3 SERPL-MCNC: 74 NG/ML (ref 30–100)
ALBUMIN SERPL BCP-MCNC: 4 G/DL (ref 3.4–5)
ALP SERPL-CCNC: 78 U/L (ref 33–110)
ALT SERPL W P-5'-P-CCNC: 37 U/L (ref 7–45)
ANION GAP SERPL CALC-SCNC: 11 MMOL/L (ref 10–20)
AST SERPL W P-5'-P-CCNC: 21 U/L (ref 9–39)
BILIRUB SERPL-MCNC: 0.3 MG/DL (ref 0–1.2)
BUN SERPL-MCNC: 15 MG/DL (ref 6–23)
CALCIUM SERPL-MCNC: 8.7 MG/DL (ref 8.6–10.6)
CHLORIDE SERPL-SCNC: 106 MMOL/L (ref 98–107)
CHOLEST SERPL-MCNC: 201 MG/DL (ref 0–199)
CHOLESTEROL/HDL RATIO: 5.8
CO2 SERPL-SCNC: 28 MMOL/L (ref 21–32)
CREAT SERPL-MCNC: 0.65 MG/DL (ref 0.5–1.05)
EGFRCR SERPLBLD CKD-EPI 2021: >90 ML/MIN/1.73M*2
ERYTHROCYTE [DISTWIDTH] IN BLOOD BY AUTOMATED COUNT: 14.2 % (ref 11.5–14.5)
EST. AVERAGE GLUCOSE BLD GHB EST-MCNC: 114 MG/DL
GLUCOSE SERPL-MCNC: 92 MG/DL (ref 74–99)
HBA1C MFR BLD: 5.6 %
HCT VFR BLD AUTO: 39.8 % (ref 36–46)
HDLC SERPL-MCNC: 34.9 MG/DL
HGB BLD-MCNC: 12.4 G/DL (ref 12–16)
LDLC SERPL CALC-MCNC: 136 MG/DL
MCH RBC QN AUTO: 25.6 PG (ref 26–34)
MCHC RBC AUTO-ENTMCNC: 31.2 G/DL (ref 32–36)
MCV RBC AUTO: 82 FL (ref 80–100)
NON HDL CHOLESTEROL: 166 MG/DL (ref 0–149)
NRBC BLD-RTO: 0 /100 WBCS (ref 0–0)
PLATELET # BLD AUTO: 485 X10*3/UL (ref 150–450)
POTASSIUM SERPL-SCNC: 4.3 MMOL/L (ref 3.5–5.3)
PROT SERPL-MCNC: 7.4 G/DL (ref 6.4–8.2)
RBC # BLD AUTO: 4.85 X10*6/UL (ref 4–5.2)
SODIUM SERPL-SCNC: 141 MMOL/L (ref 136–145)
TRIGL SERPL-MCNC: 149 MG/DL (ref 0–149)
TSH SERPL-ACNC: 1.75 MIU/L (ref 0.44–3.98)
VIT B12 SERPL-MCNC: 550 PG/ML (ref 211–911)
VLDL: 30 MG/DL (ref 0–40)
WBC # BLD AUTO: 8.3 X10*3/UL (ref 4.4–11.3)

## 2024-10-15 PROCEDURE — 82306 VITAMIN D 25 HYDROXY: CPT

## 2024-10-15 PROCEDURE — 83036 HEMOGLOBIN GLYCOSYLATED A1C: CPT

## 2024-10-15 PROCEDURE — 84443 ASSAY THYROID STIM HORMONE: CPT

## 2024-10-15 PROCEDURE — 36415 COLL VENOUS BLD VENIPUNCTURE: CPT

## 2024-10-15 PROCEDURE — 82607 VITAMIN B-12: CPT

## 2024-10-15 PROCEDURE — 80061 LIPID PANEL: CPT

## 2024-10-15 PROCEDURE — 85027 COMPLETE CBC AUTOMATED: CPT

## 2024-10-15 PROCEDURE — 80053 COMPREHEN METABOLIC PANEL: CPT

## 2024-10-15 NOTE — PROGRESS NOTES
"Subjective   Patient ID: Barbara Santiago is a 44 y.o. female who presents for cpe.    HPI   Patient is here for physical    Past recap  Patient is complaining of pain in shoulder and neck radiating to the left arm.  Lost  strength for last few days having decreased sensation  She does work with computer a lot     patient is here for follow-up on blood work  Did not get CT cardiac scoring done     patient is here for physical  She has lost 20 pounds     patient presents with complaints of right-sided neck pain and tenderness for last 2 days  Denies any sore throat denies any chest pain  She was working in the yard 3 days ago doing a lot of mulching and lift heavy bags     Patient is here for physical  Follow-up on anemia  Also mother passed away so she was under a lot of stress and not eating well and gaining weight  Having hard time losing weight with diet and exercise  Wants some consultation regarding weight loss diet pill      To establish PCP  For physical  Just questions about taking famotidine at night  She was waking up in the middle of the night so her allergist immunologist suggested to take famotidine and it has cut down her mid night awakening and cough     Past medical history: Seasonal allergies, food allergies, immune deficiency, acne, restless leg, tonsillectomy  Social history: Non-smoker nondrinker, high   Family history Father with type 2 diabetes mother with obesity heart failure MRSA infection after spinal surgery, paternal grandmother with breast cancer and osteoporosis   Review of Systems    Objective   /64   Ht 1.588 m (5' 2.5\")   Wt 109 kg (240 lb)   BMI 43.20 kg/m²     Physical Exam  Vitals reviewed.   Constitutional:       Appearance: Normal appearance.   HENT:      Head: Normocephalic and atraumatic.      Right Ear: Tympanic membrane, ear canal and external ear normal.      Left Ear: Tympanic membrane, ear canal and external ear normal.      Nose: " Nose normal.      Mouth/Throat:      Pharynx: Oropharynx is clear.   Eyes:      Extraocular Movements: Extraocular movements intact.      Conjunctiva/sclera: Conjunctivae normal.      Pupils: Pupils are equal, round, and reactive to light.   Cardiovascular:      Rate and Rhythm: Normal rate and regular rhythm.      Pulses: Normal pulses.      Heart sounds: Normal heart sounds.   Pulmonary:      Effort: Pulmonary effort is normal.      Breath sounds: Normal breath sounds.   Abdominal:      General: Abdomen is flat. Bowel sounds are normal.      Palpations: Abdomen is soft.   Musculoskeletal:      Cervical back: Normal range of motion and neck supple.   Skin:     General: Skin is warm and dry.   Neurological:      General: No focal deficit present.      Mental Status: She is alert and oriented to person, place, and time.   Psychiatric:         Mood and Affect: Mood normal.         Assessment/Plan   Problem List Items Addressed This Visit    None  Visit Diagnoses         Codes    Physical exam     Z00.00    Relevant Orders    CBC (Completed)    Comprehensive Metabolic Panel (Completed)    Hemoglobin A1C (Completed)    Lipid Panel (Completed)    Vitamin D 25-Hydroxy,Total (for eval of Vitamin D levels) (Completed)    Vitamin B12 (Completed)    Thyroid Stimulating Hormone (Completed)    Encounter for immunization     Z23    Relevant Orders    Flu vaccine, trivalent, preservative free, age 6 months and greater (Fluraix/Fluzone/Flulaval) (Completed)            Past recap  Physical normal  Routine blood work ordered  CT cardiac scoring ordered to assess the risk factors  Discussed lifestyle modification to help with the symptoms of GERD  Iron studies and B12 ordered for anemia  Patient is under care of Dr. Albert for acne  She is under care of allergist for immune deficiency  Again discussed different options for weight loss strategies diet exercise  Will refer to weight management clinic  Discussed possibility of sleep  apnea causing the symptoms patient wants to lose weight first and see if she still needs sleep study     5/16/2023  Patient has tenderness in sternoclavicular joint  I think patient pulled it and sprained it from doing a lot of physical yard work  Take Motrin and ice it and use heat CT cardiac scoring ordered to assess cardiac risk  Follow-up if not better    9/28/2023  Physical normal  Routine blood work ordered  CT cardiac scoring ordered  Discussed diet exercise    10/17/2023  Blood work reviewed  Cholesterol is high  Patient does not want to go cholesterol medication yet  Wants to try diet and exercise  Cholesterol diet chart given  Follow-up blood work in 6 months    9/26/2024  Will do x-ray of C-spine  Wondering if patient is having cervical radiculopathy  EMG of the left arm  Medrol Dosepak  Anti-inflammatory and muscle relaxant  Physical therapy  Follow-up if not better    10/15/2024  Physical normal  Shoulder muscle pain is doing better  Routine blood work ordered  Discussed diet exercise  Encouraged to lose weight  Discussed various weight loss strategies

## 2024-10-16 ENCOUNTER — TREATMENT (OUTPATIENT)
Dept: PHYSICAL THERAPY | Facility: CLINIC | Age: 44
End: 2024-10-16
Payer: COMMERCIAL

## 2024-10-16 DIAGNOSIS — M54.2 NECK PAIN: Primary | ICD-10-CM

## 2024-10-16 PROCEDURE — 97140 MANUAL THERAPY 1/> REGIONS: CPT | Mod: GP,CQ

## 2024-10-16 PROCEDURE — 97110 THERAPEUTIC EXERCISES: CPT | Mod: GP,CQ

## 2024-10-16 ASSESSMENT — PAIN - FUNCTIONAL ASSESSMENT: PAIN_FUNCTIONAL_ASSESSMENT: 0-10

## 2024-10-16 ASSESSMENT — PAIN SCALES - GENERAL: PAINLEVEL_OUTOF10: 0 - NO PAIN

## 2024-10-16 NOTE — PROGRESS NOTES
"  Physical Therapy Treatment    Patient Name: Barbara Santiago  MRN: [id number]  Today's Date: 10/16/2024  Time Calculation  Start Time: 0659  Stop Time: 0745  Time Calculation (min): 46 min  PT Therapeutic Procedures Time Entry  Manual Therapy Time Entry: 15  Therapeutic Exercise Time Entry: 30,      Current Problem  Problem List Items Addressed This Visit             ICD-10-CM    Neck pain - Primary M54.2       Insurance:  Number of Treatments Authorized: 4/40            Subjective   General  Reason for Referral: Cervicalgia  Referred By: Dr. ROBINSON Carrasquillo  General Comment: Patient feels an improvement in her L UE movements.  She over did it over weekend with pulling weeds.  She has been very compliant with cervical retraction and postural awareness. L UE stretngth is returning in L UE. Would like to increase strength and endurance.    Performing HEP?: Yes    Precautions  Precautions  STEADI Fall Risk Score (The score of 4 or more indicates an increased risk of falling): 0  Precautions Comment: None (Med screen reviewed in chart - GB)  Pain  Pain Assessment: 0-10  0-10 (Numeric) Pain Score: 0 - No pain  Pain Type: Acute pain  Pain Location: Neck    Objective       Treatments:    Therapeutic Exercise  Therapeutic Exercise Activity 1: UBE Man L1 F/R x 4 min  Therapeutic Exercise Activity 2: thoracic ect over sci fit seat - increase rad sx  Therapeutic Exercise Activity 3: doorway pec stretch with arms extended x 30\"  Therapeutic Exercise Activity 4: standing R/L UT stretch 2 x 30\" ea  Therapeutic Exercise Activity 5: standing R/L LEV stretch 2 x 30\" ea  Therapeutic Exercise Activity 6: facing the wall - alt UE lift off for LT activation  Therapeutic Exercise Activity 7: posture on the wall - \"W\" rotation - finger tip numbness  Therapeutic Exercise Activity 8: posture on the wall B ER x 10  Therapeutic Exercise Activity 9: introduce and instruct in use of thera cane for self soft tissue releases         Manual " Therapy  Manual Therapy Activity 1: seated STM and TPR to B UT, LEV, med scap boarder  Manual Therapy Activity 2: R/L scap mobs                     OP EDUCATION:  Outpatient Education  Education Comment: Access Code: [email or web address]  URL: [email or web address]  Date: 10/16/2024  Prepared by: Dayanna Sullivan    Exercises  - Upper Trapezius Stretch  - 1-2 x daily - 7 x weekly - 2 reps - 30 hold  - Seated Levator Scapulae Stretch  - 1 x daily - 7 x weekly - 3 sets - 10 reps  - Low Trap Setting at Wall  - 1 x daily - 7 x weekly - 2 sets - 10 reps  - Shoulder External Rotation and Scapular Retraction  - 1-2 x daily - 7 x weekly - 10 reps    Assessment:  PT Assessment  Assessment Comment: Patient continues to respond well to cervical retraction.  She presents with over active UT/LEV and decreased ability to activate her opposing muscle groups.  Educated patient on long neck and scapular depression for improved soft tissue balance. Felt looser after session.    Plan:  OP PT Plan  Treatment/Interventions: Cryotherapy, Dry needling, Education/ Instruction, Electrical stimulation, Hot pack, Manual therapy, Neuromuscular re-education, Self care/ home management, Therapeutic activities, Taping techniques, Therapeutic exercises  PT Plan: Skilled PT  PT Frequency: 2 times per week  Duration: 10 weeks  Onset Date: 09/11/24  Number of Treatments Authorized: 4/40  Rehab Potential: Good  Plan of Care Agreement: Patient    Goals:  Active       PT Problem       PT Goal 1       Start:  10/02/24    Expected End:  12/11/24       STG  1) Patient will show an improvement in cervical R rotation ROM by 8 degrees in order to allow for improved driving ability in 6 weeks.  2) Patient will be able to perform all daily tasks with pain no greater than a 1 /10 on the VAS in 5 weeks.  3) Patient will be independent with HEP in 3 visits in order to allow for improved function with home and community tasks.    LTG  1) Patient will  achieve a NDI score of 1 /50 in order to indicate an improvement in function at home in 10 weeks.  2) Patient will improve scapular strength to 5-/5 in order to reduce compensatory guarding in upper trapezius and greater functional use of upper extremities in 9 weeks.              Patient Stated Goal 1       Start:  10/02/24    Expected End:  12/11/24       Reduce pain of pinched nerve              Brooke [place], PTA

## 2024-10-18 ENCOUNTER — TREATMENT (OUTPATIENT)
Dept: PHYSICAL THERAPY | Facility: CLINIC | Age: 44
End: 2024-10-18
Payer: COMMERCIAL

## 2024-10-18 DIAGNOSIS — M54.2 NECK PAIN: Primary | ICD-10-CM

## 2024-10-18 PROCEDURE — 97110 THERAPEUTIC EXERCISES: CPT | Mod: GP,CQ

## 2024-10-18 PROCEDURE — 97140 MANUAL THERAPY 1/> REGIONS: CPT | Mod: GP,CQ

## 2024-10-18 ASSESSMENT — PAIN SCALES - GENERAL: PAINLEVEL_OUTOF10: 0 - NO PAIN

## 2024-10-18 ASSESSMENT — PAIN - FUNCTIONAL ASSESSMENT: PAIN_FUNCTIONAL_ASSESSMENT: 0-10

## 2024-10-18 NOTE — PROGRESS NOTES
"  Physical Therapy Treatment    Patient Name: Barbara Santiago  MRN: [id number]  Today's Date: 10/18/2024  Time Calculation  Start Time: 0756  Stop Time: 0844  Time Calculation (min): 48 min  PT Therapeutic Procedures Time Entry  Manual Therapy Time Entry: 20  Therapeutic Exercise Time Entry: 28,      Current Problem  Problem List Items Addressed This Visit             ICD-10-CM    Neck pain - Primary M54.2       Insurance:  Number of Treatments Authorized: 5/40            Subjective   General  Reason for Referral: Cervicalgia  Referred By: Dr. ROBINSON Carrasquillo  General Comment: Patient reports she tried to lift a frying pan out of the cupboard yesterday and she almost dropped it due to  strength weakness.  Patient was quite upset at the fact she is still experiencing L UE weakness.  She notes her pain sx have reduced.    Performing HEP?: Yes    Precautions  Precautions  STEADI Fall Risk Score (The score of 4 or more indicates an increased risk of falling): 0  Precautions Comment: None (Med screen reviewed in chart - GB)  Pain  Pain Assessment: 0-10  0-10 (Numeric) Pain Score: 0 - No pain  Pain Type: Acute pain  Pain Location: Neck    Objective       Treatments:    Therapeutic Exercise  Therapeutic Exercise Activity 1: much time spent educating patient on nerve impingement and resulting weakness  Therapeutic Exercise Activity 2: recommend patient perform active squeezing/gripping activities during the day  Therapeutic Exercise Activity 3: performed 2# wrist flex 2 x 10  Therapeutic Exercise Activity 4: 2# wrist extension 2 x 10  Therapeutic Exercise Activity 5: 2# wrist pro/sup x 10  Therapeutic Exercise Activity 6: stretch for L wrist extensor mass 3 x 20\"  Therapeutic Exercise Activity 7: supine cervical retraction         Manual Therapy  Manual Therapy Activity 1: Manual cervical traction x 5 min  Manual Therapy Activity 2: STM/TPR L UT, LEV, rhomboids, L wrist extensor mass, bicep                     OP " EDUCATION:  Outpatient Education  Education Comment: Access Code: [email or web address]  URL: [email or web address]  Date: 10/18/2024  Prepared by: Dayanna Sullivan    Exercises  - Seated Wrist Flexion AROM  - 1-2 x daily - 7 x weekly - 2 sets - 10 reps  - Wrist Flexion AROM  - 1-2 x daily - 7 x weekly - 2 sets - 10 reps  - Seated Pronation Supination with Dumbbell  - 1-2 x daily - 7 x weekly - 2 sets - 10 reps  - Standing Wrist Flexion Stretch  - 1-2 x daily - 7 x weekly - 13 reps - 20 hold    Assessment:  PT Assessment  Assessment Comment: Patient has make progress with cervical pain and rad sx.  She continues to exhibit weakness in her L forearm and  strength.  Patient was quite upset during today's visit due to weakness.  Patient was given some gentle forearm and wrist strengthening exercises as well as instruction on self soft tissue releases.  Felt better after session with a reduction in soft tissue tone.    Plan:  OP PT Plan  Treatment/Interventions: Cryotherapy, Dry needling, Education/ Instruction, Electrical stimulation, Hot pack, Manual therapy, Neuromuscular re-education, Self care/ home management, Therapeutic activities, Taping techniques, Therapeutic exercises  PT Plan: Skilled PT  PT Frequency: 2 times per week  Duration: 10 weeks  Onset Date: 09/11/24  Number of Treatments Authorized: 5/40  Rehab Potential: Good  Plan of Care Agreement: Patient    Goals:  Active       PT Problem       PT Goal 1       Start:  10/02/24    Expected End:  12/11/24       STG  1) Patient will show an improvement in cervical R rotation ROM by 8 degrees in order to allow for improved driving ability in 6 weeks.  2) Patient will be able to perform all daily tasks with pain no greater than a 1 /10 on the VAS in 5 weeks.  3) Patient will be independent with HEP in 3 visits in order to allow for improved function with home and community tasks.    LTG  1) Patient will achieve a NDI score of 1 /50 in order to indicate  an improvement in function at home in 10 weeks.  2) Patient will improve scapular strength to 5-/5 in order to reduce compensatory guarding in upper trapezius and greater functional use of upper extremities in 9 weeks.              Patient Stated Goal 1       Start:  10/02/24    Expected End:  12/11/24       Reduce pain of pinched nerve              Brooke Sullivan, PTA

## 2024-10-23 ENCOUNTER — TREATMENT (OUTPATIENT)
Dept: PHYSICAL THERAPY | Facility: CLINIC | Age: 44
End: 2024-10-23
Payer: COMMERCIAL

## 2024-10-23 DIAGNOSIS — M54.2 NECK PAIN: ICD-10-CM

## 2024-10-23 PROCEDURE — 97140 MANUAL THERAPY 1/> REGIONS: CPT | Mod: GP | Performed by: PHYSICAL THERAPIST

## 2024-10-23 PROCEDURE — 97110 THERAPEUTIC EXERCISES: CPT | Mod: GP | Performed by: PHYSICAL THERAPIST

## 2024-10-23 ASSESSMENT — PAIN SCALES - GENERAL: PAINLEVEL_OUTOF10: 0 - NO PAIN

## 2024-10-23 ASSESSMENT — PAIN - FUNCTIONAL ASSESSMENT: PAIN_FUNCTIONAL_ASSESSMENT: 0-10

## 2024-10-23 NOTE — PROGRESS NOTES
Physical Therapy Treatment    Patient Name: Barbara Santiago  MRN: [id number]  Today's Date: 10/23/2024    Current Problem  Problem List Items Addressed This Visit             ICD-10-CM    Neck pain M54.2       Insurance:  Payor: [employer] [facility] / Plan: [employer] / Product Type: *No Product type* /   Number of Treatments Authorized: 6/40          Subjective   General  Reason for Referral: Cervicalgia  Referred By: Dr. ROBINSON Carrasquillo  General Comment: Patient states that over the past couple weeks she has seen weakness in her L hand compared to the R. Notes Monday she goes for a nerve conduction test.    Performing HEP?: Yes    Precautions  Precautions  STEADI Fall Risk Score (The score of 4 or more indicates an increased risk of falling): 0  Precautions Comment: None (Med screen reviewed in chart - GB)  Pain  Pain Assessment: 0-10  0-10 (Numeric) Pain Score: 0 - No pain  Pain Type: Acute pain  Pain Location: Neck    Objective   Cervical Spine    Cervical AROM  Cervical rotation right: (80°): 71  Cervical rotation left: (80°): 70    Special Tests  Other:  Strength (R,L): Lvl 2 80 lbs, 45 lbs;  Lvl 4 62 lbs 40 lbs    Treatments:    Therapeutic Exercise  Therapeutic Exercise Activity 1: UBE Man L1 F/R x 3 min  Therapeutic Exercise Activity 2: ULTT median and radial x 10 ea on L  Therapeutic Exercise Activity 3: Supine ER isometric with chest press x 10, x 10 with 5# KB  Therapeutic Exercise Activity 4: Serratus slide foam roller 3 x 12  Therapeutic Exercise Activity 5: Bent over row 10# 3 x 12 on L         Manual Therapy  Manual Therapy Activity 1: STM/TPR L UT, LEV, rhomboids, L wrist extensor mass, bicep      Assessment:  PT Assessment  Assessment Comment: Patient with improved cervical ROM and  strength from IE. Remains tender along wrist extensor group and educated patient on neural pathway and muscle tenderness in distal aspect of arm. Trialed UE proximal strengthening with fatigue though no  increase in pain or UE symptoms that sustained.    Plan:  OP PT Plan  Treatment/Interventions: Cryotherapy, Dry needling, Education/ Instruction, Electrical stimulation, Hot pack, Manual therapy, Neuromuscular re-education, Self care/ home management, Therapeutic activities, Taping techniques, Therapeutic exercises  PT Plan: Skilled PT  PT Frequency: 2 times per week  Duration: 10 weeks  Onset Date: 09/11/24  Number of Treatments Authorized: 6/40  Rehab Potential: Good  Plan of Care Agreement: Patient    Goals:  Active       PT Problem       PT Goal 1       Start:  10/02/24    Expected End:  12/11/24       STG  1) Patient will show an improvement in cervical R rotation ROM by 8 degrees in order to allow for improved driving ability in 6 weeks.  2) Patient will be able to perform all daily tasks with pain no greater than a 1 /10 on the VAS in 5 weeks.  3) Patient will be independent with HEP in 3 visits in order to allow for improved function with home and community tasks.    LTG  1) Patient will achieve a NDI score of 1 /50 in order to indicate an improvement in function at home in 10 weeks.  2) Patient will improve scapular strength to 5-/5 in order to reduce compensatory guarding in upper trapezius and greater functional use of upper extremities in 9 weeks.              Patient Stated Goal 1       Start:  10/02/24    Expected End:  12/11/24       Reduce pain of pinched nerve              Time Calculation  Start Time: 0701  Stop Time: 0745  Time Calculation (min): 44 min  PT Therapeutic Procedures Time Entry  Manual Therapy Time Entry: 18  Therapeutic Exercise Time Entry: 21,

## 2024-10-25 ENCOUNTER — TREATMENT (OUTPATIENT)
Dept: PHYSICAL THERAPY | Facility: CLINIC | Age: 44
End: 2024-10-25
Payer: COMMERCIAL

## 2024-10-25 DIAGNOSIS — M54.2 NECK PAIN: ICD-10-CM

## 2024-10-25 PROCEDURE — 97110 THERAPEUTIC EXERCISES: CPT | Mod: GP

## 2024-10-25 NOTE — PROGRESS NOTES
Physical Therapy Treatment    Patient Name: Barbara Santiago  MRN: [id number]  Today's Date: 10/25/2024    Current Problem  Problem List Items Addressed This Visit             ICD-10-CM    Neck pain M54.2       Insurance:  Payor: [employer] [facility] / Plan: [employer] / Product Type: *No Product type* /   Number of Treatments Authorized: 7/40          Subjective   General   Leaving for about 10 days for work, will be [place] of the [place].  Has the nerve conduction test this Monday.   strength was improved at last session.  Pain is much better in her arm, tingling is coming and going.  Able to reach over head on wall without tingling.  Arm is getting tired still with self care/overhead activities, like drying hair.  Uses the stress ball at work a small weight for her wrist.  Denies any arm sx or neck pain  over the past 4-5 days. Has not been taking pain meds or steroids.      Performing HEP?: Yes    Precautions  Precautions  Precautions Comment: None (Med screen reviewed in chart - GB)  Pain   0/10     Objective   Cervical Spine    Cervical AROM   C/S Movement Loss - Nil/Min/Mod/Max loss or degrees     Retraction: min     Protraction: nil     Flexion: NT     Extension: min     R rot: nil     L rot: nil     R SB: nil - end range tension     L SB: nil - end range tension   L Functional IR - pulling in GHJ   Reaching across with L arm and using fingers   L  strength =  40#s     Treatments:    Therapeutic Exercise  Sitting with roll x 2 min -  C/S ret with self OP x 10 - Stretching in periscap, NE  C/S sustained ret 30 sec x 2     Manual: C/s ret with PT OP - Produce L UT, NW, min better with UE baselines    Therex;  C/S ret/ext x 10 - P, upper arm, NW  T/S ext over chair 10 x 2 - P, L GHJ and finger numbness, NE on UE baselines   C/s L Sbing x 10  C/S L Sbing sustained x 30 sec    C/S ret with self OP x 5 - Produce L UT  C/S ret with towel OP x 5    Educated pt on proper sx response to cervical  procedures and centralization.     Assessment:   Pt continues to slowly progress.  C/S ret/ext and lateral procedures provided no additional improvement in baselines.  Pt is very anxious about sx returning or not improving. Pt demo'd understanding of education above, which helped address her concerns about what she is feeling during the exercises.     Plan:  F/U with c/s ret with self OP.   F/U with education regarding proper sx response.  OP PT Plan  Number of Treatments Authorized: 7/40    Goals:  Active       PT Problem       PT Goal 1       Start:  10/02/24    Expected End:  12/11/24       STG  1) Patient will show an improvement in cervical R rotation ROM by 8 degrees in order to allow for improved driving ability in 6 weeks.  2) Patient will be able to perform all daily tasks with pain no greater than a 1 /10 on the VAS in 5 weeks.  3) Patient will be independent with HEP in 3 visits in order to allow for improved function with home and community tasks.    LTG  1) Patient will achieve a NDI score of 1 /50 in order to indicate an improvement in function at home in 10 weeks.  2) Patient will improve scapular strength to 5-/5 in order to reduce compensatory guarding in upper trapezius and greater functional use of upper extremities in 9 weeks.              Patient Stated Goal 1       Start:  10/02/24    Expected End:  12/11/24       Reduce pain of pinched nerve              Time Calculation  Start Time: 0750  Stop Time: 0836  Time Calculation (min): 46 min  PT Therapeutic Procedures Time Entry  Manual Therapy Time Entry: 2  Therapeutic Exercise Time Entry: 39,

## 2024-10-28 ENCOUNTER — APPOINTMENT (OUTPATIENT)
Dept: NEUROLOGY | Facility: CLINIC | Age: 44
End: 2024-10-28
Payer: COMMERCIAL

## 2024-10-29 ENCOUNTER — TREATMENT (OUTPATIENT)
Dept: PHYSICAL THERAPY | Facility: CLINIC | Age: 44
End: 2024-10-29
Payer: COMMERCIAL

## 2024-10-29 DIAGNOSIS — M54.2 NECK PAIN: ICD-10-CM

## 2024-10-29 PROCEDURE — 97110 THERAPEUTIC EXERCISES: CPT | Mod: GP

## 2024-10-29 PROCEDURE — 97140 MANUAL THERAPY 1/> REGIONS: CPT | Mod: GP

## 2024-11-12 ENCOUNTER — APPOINTMENT (OUTPATIENT)
Dept: PHYSICAL THERAPY | Facility: CLINIC | Age: 44
End: 2024-11-12
Payer: COMMERCIAL

## 2024-11-12 DIAGNOSIS — M54.2 NECK PAIN: ICD-10-CM

## 2024-12-04 ENCOUNTER — TREATMENT (OUTPATIENT)
Dept: PHYSICAL THERAPY | Facility: CLINIC | Age: 44
End: 2024-12-04
Payer: COMMERCIAL

## 2024-12-04 DIAGNOSIS — M54.2 NECK PAIN: ICD-10-CM

## 2024-12-04 PROCEDURE — 97110 THERAPEUTIC EXERCISES: CPT | Mod: GP

## 2024-12-04 NOTE — PROGRESS NOTES
Physical Therapy Treatment    Patient Name: Barbara Santiago  MRN: [id number]  Today's Date: 12/4/2024    Current Problem  Problem List Items Addressed This Visit             ICD-10-CM    Neck pain M54.2       Insurance:  Payor: [facility] / Plan: [employer] / Product Type: *No Product type* /              Subjective   General   Had fun on her work trip. Pain was pretty good even though was only able to do HEP x 2 a day.  The only remaining sx are some soreness in the clavicle .     Performing HEP?: Partially - as much as possible on her trip x 2 a day.       Precautions     Pain   1/10 Left periscap     Objective   Cervical Spine    Cervical AROM   C/S Movement Loss - Nil/Min/Mod/Max loss or degrees     Retraction: min     Protraction: nil     Flexion:nil     Extension: min     R rot: nil/min     L rot: nil - pinch on R      R SB: nil - pinch on R      L SB: nil   L Functional IR - min  L GHJ hor add - ERP in L GHJ   L  strength =  55#s     Treatments:    Therapeutic Exercise  Sitting with roll x 2 min   C/S ret x 20   C/S ret with self OP x 10   C/S ret with towel OP x 10   Matrix:   B Rows 10#s 1 min x 2   B Ext 10#s 1 min x 2   Standing at wall:  B ER red band 1 min x 2  B Hor abd yellow band 1 min x 2   C/S ret with towel OP 10 x 2     Education:  Date: 12/04/2024  Prepared by: Reynaldo Villatoro  Exercises  - Standing Shoulder Row with Anchored Resistance  - 1 x daily - 2-3 x weekly - 2-3 sets - 10 reps  - Shoulder extension with resistance - Neutral  - 1 x daily - 2-3 x weekly - 2-3 sets - 10 reps  - Standing Shoulder External Rotation with Resistance  - 1 x daily - 2-3 x weekly - 2-3 sets - 10 reps  - Seated Shoulder Horizontal Abduction with Resistance  - 1 x daily - 2-3 x weekly - 2-3 sets - 10 reps    Assessment:   Pt continues to have improvement in baselines with retraction forces and increased force provided additional improvement. Pt is pleased with her progress and feels able  to continue to self management with HEP.  Tolerated strengthening without complaint of pain.      Plan:  Discharge from PT.       Goals:  Active       PT Problem       PT Goal 1       Start:  10/02/24    Expected End:  12/11/24       STG  1) Patient will show an improvement in cervical R rotation ROM by 8 degrees in order to allow for improved driving ability in 6 weeks.  2) Patient will be able to perform all daily tasks with pain no greater than a 1 /10 on the VAS in 5 weeks.  3) Patient will be independent with HEP in 3 visits in order to allow for improved function with home and community tasks.    LTG  1) Patient will achieve a NDI score of 1 /50 in order to indicate an improvement in function at home in 10 weeks.  2) Patient will improve scapular strength to 5-/5 in order to reduce compensatory guarding in upper trapezius and greater functional use of upper extremities in 9 weeks.              Patient Stated Goal 1       Start:  10/02/24    Expected End:  12/11/24       Reduce pain of pinched nerve              Time Calculation  Start Time: 1319  Stop Time: 1400  Time Calculation (min): 41 min  PT Therapeutic Procedures Time Entry  Therapeutic Exercise Time Entry: 39,

## 2024-12-12 ENCOUNTER — OFFICE VISIT (OUTPATIENT)
Dept: PRIMARY CARE | Facility: CLINIC | Age: 44
End: 2024-12-12
Payer: COMMERCIAL

## 2024-12-12 VITALS
HEIGHT: 63 IN | BODY MASS INDEX: 42.7 KG/M2 | SYSTOLIC BLOOD PRESSURE: 122 MMHG | WEIGHT: 241 LBS | DIASTOLIC BLOOD PRESSURE: 70 MMHG

## 2024-12-12 DIAGNOSIS — S80.12XA HEMATOMA OF LEFT LOWER LEG: Primary | ICD-10-CM

## 2024-12-12 PROCEDURE — 3008F BODY MASS INDEX DOCD: CPT | Performed by: INTERNAL MEDICINE

## 2024-12-12 PROCEDURE — 99213 OFFICE O/P EST LOW 20 MIN: CPT | Performed by: INTERNAL MEDICINE

## 2024-12-12 NOTE — PROGRESS NOTES
Subjective   Patient ID: Barbara Santiago is a 44 y.o. female who presents for Follow-up (Ankle pain left/Fall, left shin pain).    HPI   Patient is here with complaints of having left ankle pain and left shin pain.  July twisted ankle really hard kept it elevated and iced it and felt better but late September working in the yard there was a equipment got any When she was working which popped up and came back to her and cracked and shin and bruised and swelling now she has bruising anterior aspect of the lower leg     patient is here for physical    Past recap  Patient is complaining of pain in shoulder and neck radiating to the left arm.  Lost  strength for last few days having decreased sensation  She does work with computer a lot     patient is here for follow-up on blood work  Did not get CT cardiac scoring done     patient is here for physical  She has lost 20 pounds     patient presents with complaints of right-sided neck pain and tenderness for last 2 days  Denies any sore throat denies any chest pain  She was working in the yard 3 days ago doing a lot of mulching and lift heavy bags     Patient is here for physical  Follow-up on anemia  Also mother passed away so she was under a lot of stress and not eating well and gaining weight  Having hard time losing weight with diet and exercise  Wants some consultation regarding weight loss diet pill      To establish PCP  For physical  Just questions about taking famotidine at night  She was waking up in the middle of the night so her allergist immunologist suggested to take famotidine and it has cut down her mid night awakening and cough     Past medical history: Seasonal allergies, food allergies, immune deficiency, acne, restless leg, tonsillectomy  Social history: Non-smoker nondrinker, high   Family history Father with type 2 diabetes mother with obesity heart failure MRSA infection after spinal surgery, paternal grandmother with  "breast cancer and osteoporosis   Review of Systems    Objective   /70   Ht 1.588 m (5' 2.5\")   Wt 109 kg (241 lb)   BMI 43.38 kg/m²     Physical Exam  Vitals reviewed.   Constitutional:       Appearance: Normal appearance.   HENT:      Head: Normocephalic and atraumatic.      Right Ear: Tympanic membrane, ear canal and external ear normal.      Left Ear: Tympanic membrane, ear canal and external ear normal.      Nose: Nose normal.      Mouth/Throat:      Pharynx: Oropharynx is clear.   Eyes:      Extraocular Movements: Extraocular movements intact.      Conjunctiva/sclera: Conjunctivae normal.      Pupils: Pupils are equal, round, and reactive to light.   Cardiovascular:      Rate and Rhythm: Normal rate and regular rhythm.      Pulses: Normal pulses.      Heart sounds: Normal heart sounds.   Pulmonary:      Effort: Pulmonary effort is normal.      Breath sounds: Normal breath sounds.   Abdominal:      General: Abdomen is flat. Bowel sounds are normal.      Palpations: Abdomen is soft.   Musculoskeletal:      Cervical back: Normal range of motion and neck supple.   Skin:     General: Skin is warm and dry.      Findings: Rash present.      Comments: Hematoma over left anterior lower leg   Neurological:      General: No focal deficit present.      Mental Status: She is alert and oriented to person, place, and time.   Psychiatric:         Mood and Affect: Mood normal.         Assessment/Plan   Problem List Items Addressed This Visit    None  Visit Diagnoses         Codes    Hematoma of left lower leg    -  Primary S80.12XA              Past recap  Physical normal  Routine blood work ordered  CT cardiac scoring ordered to assess the risk factors  Discussed lifestyle modification to help with the symptoms of GERD  Iron studies and B12 ordered for anemia  Patient is under care of Dr. Albert for acne  She is under care of allergist for immune deficiency  Again discussed different options for weight loss " strategies diet exercise  Will refer to weight management clinic  Discussed possibility of sleep apnea causing the symptoms patient wants to lose weight first and see if she still needs sleep study     5/16/2023  Patient has tenderness in sternoclavicular joint  I think patient pulled it and sprained it from doing a lot of physical yard work  Take Motrin and ice it and use heat CT cardiac scoring ordered to assess cardiac risk  Follow-up if not better    9/28/2023  Physical normal  Routine blood work ordered  CT cardiac scoring ordered  Discussed diet exercise    10/17/2023  Blood work reviewed  Cholesterol is high  Patient does not want to go cholesterol medication yet  Wants to try diet and exercise  Cholesterol diet chart given  Follow-up blood work in 6 months    9/26/2024  Will do x-ray of C-spine  Wondering if patient is having cervical radiculopathy  EMG of the left arm  Medrol Dosepak  Anti-inflammatory and muscle relaxant  Physical therapy  Follow-up if not better    10/15/2024  Physical normal  Shoulder muscle pain is doing better  Routine blood work ordered  Discussed diet exercise  Encouraged to lose weight  Discussed various weight loss strategies    12/12/2024  Patient has this hematoma from the trauma  It will take time and it will go away  No signs of fracture in the bone  No signs of infection

## 2024-12-24 ENCOUNTER — OFFICE VISIT (OUTPATIENT)
Dept: URGENT CARE | Age: 44
End: 2024-12-24
Payer: COMMERCIAL

## 2024-12-24 VITALS
WEIGHT: 240 LBS | DIASTOLIC BLOOD PRESSURE: 68 MMHG | HEART RATE: 102 BPM | TEMPERATURE: 97.8 F | SYSTOLIC BLOOD PRESSURE: 132 MMHG | HEIGHT: 62 IN | RESPIRATION RATE: 18 BRPM | BODY MASS INDEX: 44.16 KG/M2 | OXYGEN SATURATION: 98 %

## 2024-12-24 DIAGNOSIS — H60.332 ACUTE SWIMMER'S EAR OF LEFT SIDE: ICD-10-CM

## 2024-12-24 DIAGNOSIS — H61.23 BILATERAL IMPACTED CERUMEN: Primary | ICD-10-CM

## 2024-12-24 RX ORDER — CIPROFLOXACIN AND DEXAMETHASONE 3; 1 MG/ML; MG/ML
4 SUSPENSION/ DROPS AURICULAR (OTIC) 2 TIMES DAILY
Qty: 7.5 ML | Refills: 0 | Status: SHIPPED | OUTPATIENT
Start: 2024-12-24 | End: 2024-12-31

## 2024-12-24 NOTE — PROGRESS NOTES
Chief Complaint   Patient presents with    ear blockage     Right ear needs flushed       Physical Exam:   The bilateral canals are occluded with cerumen. After irrigation the Right TM and canal are found to be unremarkable. The left  canal is erythematous and swollen. Exudate present. Tympanic membrane unremarkable.           Assessment:     Encounter Diagnoses   Name Primary?    Bilateral impacted cerumen Yes    Acute swimmer's ear of left side           Medical Decision Making & Plan:   Irrigated in  clinic  Ciprodex drops        12/24/24 at 9:53 AM - Jumana Owens DO

## 2025-01-24 ENCOUNTER — DOCUMENTATION (OUTPATIENT)
Dept: PHYSICAL THERAPY | Facility: CLINIC | Age: 45
End: 2025-01-24
Payer: COMMERCIAL

## 2025-01-24 DIAGNOSIS — M54.2 NECK PAIN: ICD-10-CM

## 2025-01-24 NOTE — PROGRESS NOTES
Discharge Summary    Name: Barbara Santiago  MRN: [id number]  : 1980  Date: 25    Discharge Summary: PT    Discharge Information: Date of discharge 25    Therapy Summary:  Pt continues to have improvement in baselines with retraction forces and increased force provided additional improvement.     Discharge Status: The only remaining sx are some soreness in the clavicle .  Pt is pleased with her progress and feels able to continue to self management with HEP.     Rehab Discharge Reason: Achieved all and/or the most significant goals(s)

## 2025-07-24 ENCOUNTER — OFFICE VISIT (OUTPATIENT)
Dept: OTOLARYNGOLOGY | Facility: CLINIC | Age: 45
End: 2025-07-24
Payer: COMMERCIAL

## 2025-07-24 VITALS — WEIGHT: 240 LBS | BODY MASS INDEX: 43.9 KG/M2

## 2025-07-24 DIAGNOSIS — J32.9 CHRONIC SINUSITIS, UNSPECIFIED LOCATION: ICD-10-CM

## 2025-07-24 DIAGNOSIS — R09.A2 GLOBUS SENSATION: Primary | ICD-10-CM

## 2025-07-24 DIAGNOSIS — J30.9 ALLERGIC RHINITIS, UNSPECIFIED SEASONALITY, UNSPECIFIED TRIGGER: ICD-10-CM

## 2025-07-24 DIAGNOSIS — M26.609 TEMPOROMANDIBULAR JOINT DISORDER: ICD-10-CM

## 2025-07-24 DIAGNOSIS — K21.9 CHRONIC GERD: ICD-10-CM

## 2025-07-24 PROCEDURE — 31575 DIAGNOSTIC LARYNGOSCOPY: CPT | Performed by: GENERAL PRACTICE

## 2025-07-24 PROCEDURE — 1036F TOBACCO NON-USER: CPT | Performed by: GENERAL PRACTICE

## 2025-07-24 PROCEDURE — 99204 OFFICE O/P NEW MOD 45 MIN: CPT | Performed by: GENERAL PRACTICE

## 2025-07-24 RX ORDER — MAGNESIUM CARB/ALUMINUM HYDROX 105-160MG
1 TABLET,CHEWABLE ORAL 3 TIMES DAILY
Qty: 90 TABLET | Refills: 3 | Status: SHIPPED | OUTPATIENT
Start: 2025-07-24

## 2025-07-24 RX ORDER — FERROUS SULFATE 325(65) MG
TABLET ORAL
COMMUNITY

## 2025-07-24 RX ORDER — MUPIROCIN 20 MG/G
OINTMENT TOPICAL 2 TIMES DAILY
Qty: 22 G | Refills: 2 | Status: SHIPPED | OUTPATIENT
Start: 2025-07-24 | End: 2025-08-07

## 2025-07-24 ASSESSMENT — PATIENT HEALTH QUESTIONNAIRE - PHQ9
SUM OF ALL RESPONSES TO PHQ9 QUESTIONS 1 AND 2: 0
2. FEELING DOWN, DEPRESSED OR HOPELESS: NOT AT ALL
1. LITTLE INTEREST OR PLEASURE IN DOING THINGS: NOT AT ALL

## 2025-07-24 NOTE — PROGRESS NOTES
Otolaryngology - Head and Neck Surgery Outpatient New Patient Visit Note        Assessment/Plan:   Problem List Items Addressed This Visit           ICD-10-CM       ENT    Sinusitis J32.9    Relevant Medications    mupirocin (Bactroban) 2 % ointment    Other Relevant Orders    CT sinus wo IV contrast    Allergic rhinosinusitis J30.9     Other Visit Diagnoses         Codes      Globus sensation    -  Primary R09.A2    Relevant Orders    TSH with reflex to Free T4 if abnormal    Thyroid Peroxidase (TPO) Antibody      Temporomandibular joint disorder     M26.609    Relevant Orders    Referral to Physical Therapy      Chronic GERD     K21.9    Relevant Medications    aluminum hydrox-magnesium carb (Gaviscon Extra Strength) 160-105 mg tablet,chewable              45yoF with globus and throat irritation in the setting of chronic rhinosinusitis, and GERD.     Will acquire CT sinus to aid in eval  Recently finished course of augmentin.   Mucosal inflammation without obvious sinus drainage on NP Scope.   Mupirocin BID for 2 weeks, then saline gel PRN.     Will defer allergy management to allergy clinic.     Discussed conservative management of reflux, and risks of long term anti-reflux medications.  Discussed avoidance of triggers, dietary and behavioral management strategies for reflux.  Discussed possible referral to GI for further testing and evaluation.  Will trial maximal medical therapy (gaviscon and H2 blockers) for 1-2 months and assess for symptom response.  Plan for taper of medical management to least possible to control symptoms, in favor of using dietary/behavioral controls.         Pt with concerns for possible thyroid inflammatory condition, will acquire updated TSH and TPO.      Pt with TMJ associated with malocclusion, discussed conservative management.  Will have pt see PT.      The etiology of temporomandibular joint disorders (TMD) was discussed in detail with patient including: structural issues such as  alterations in dental occlusion (the alignment of the upper and lower teeth) that affect maxillomandibular position and function. These issues may or may not be associated with joint trauma, poor posture or cervicogenic etiologies.  Possible psychologic factors include anxiety, depression and PTSD. Multiple other contributing and comorbid factors, including biological, behavioral, environmental, and cognitive disorders which can all contribute to the development of symptoms were also reviewed with the patient.      PLAN:  1) Lifestyle interventions for management of TMJ dysfunction were discussed with the patient today including use of warm compresses, massage of the TMJ joint, and/or use of NSAIDS PRN for pain and intimation and soft chew diet.      Additional options for further evaluation and management of TMD were discussed with the patient today and may include the following referrals upon patient request:  1) Referral to physical therapy for further evaluation and management of cervicogenic/ postural related issues.  2) Referral to [facility] for evaluation and management with alternative therapeutic modalities such as massage or acupuncture.   3) Referral to dental medicine for further evaluation and management of structural/ mechanical concerns such as malocclusion or bruxism.     Follow up:  -plan for follow up in clinic as needed, after completion of ordered studies, and in 1-2 months    All of the above findings, impressions, treatment planning and follow up plans were discussed with the patient who indicated understanding.  the patient was instructed to contact or return to clinic sooner if symptoms/signs persist or worsen despite the above management.      Corby Andino MD  Otolaryngology - Head and Neck Surgery            History Of Present Illness  Barbara Santiago is a 45 y.o. female presenting for chronic hoarseness, globus, pharyngeal mucous and sensation of thraot tightness,  waxing/waning over weeks.     Notes a long history of rhinitis and recurrent sinusitis  Notes flares of sinusitis every 2-3mo  Follows with allergy clinic with use of nasacort/astelin/afrin spray and singulair.   Notes some waxing/waning L>R inflammation in left nose limiting use of spray  Notes waxing/waning sinus pressure/discomfort and PND.     Reports intermittent treatment with antibiotics, recently finished 2 weeks of augmentin     Reports a history of GERD, with recent increase in symptoms.   Uses famotidine at bedtime with some effect.     Notes concerns for possible thyroid pathology given neck/throat fullness and reported history of auto-immune dysfunction.     Notes a history of TMJ which is associated with known malocclusion.   Uses conservative management with limited effect.                 Past Medical History  She has a past medical history of Allergic rhinitis, Fatigue, GERD (gastroesophageal reflux disease), Obesity, Personal history of other diseases of the respiratory system (07/23/2017), TMJ dysfunction, and Varicella.    Surgical History  She has a past surgical history that includes Tonsillectomy (1986).     Social History  She reports that she has never smoked. She has never used smokeless tobacco. She reports that she does not drink alcohol and does not use drugs.    Family History  Family History[1]     Allergies  Ropinirole    Review of Systems  ROS: Pertinent positives as noted in HPI.    - CONSTITUTIONAL: Does not report weight loss, fever or chills.    - HEENT:   Ear: Does not report tinnitus, vertigo, hearing loss, otalgia, otorrhea  Nose: Does not report  ,  , epistaxis, decreased smell  Throat: Does not report  ,  , odynophagia  Larynx: Does not report  ,  difficulty breathing, pain with speaking (odynophonia)  Neck: Does not report new masses, pain, swelling  Face: Does not report    ,  , swelling, numbness, weakness     - RESPIRATORY: Does not report SOB or  .    - CV: Does not  report palpitations or chest pain.     - GI: Does not report abdominal pain, nausea, vomiting or diarrhea.    - : Does not report dysuria or urinary frequency.    - MSK: Does not report myalgia or joint pain.    - SKIN: Does not report rash or pruritus.    - NEUROLOGICAL: Does not report headache or syncope.    - PSYCHIATRIC: Does not report recent changes in mood. Does not report anxiety or depression.         Physical Exam:     GENERAL:   Alert & Oriented to person, place and time; Normal affect and appearance. Well developed and well nourished. Conversant & cooperative with examination.     HEAD:   Normocephalic, atraumatic. No sinus tenderness to palpation. Normal parotid bilaterally. Normal facial strength.     NEUROLOGIC:   Cranial nerves II-XII grossly intact, gait WNL. Normal mood and affect.    EYES:   Extraocular movements intact. Pupils equal, round, reactive to light and accommodation. No nystagmus, no ptosis. no scleral injection.    EAR:   Normal auricle. No discomfort or TTP with manipulation.   Handheld otoscopic exam showed normal external auditory canals bilaterally. No purulence or EAC inflammation. Minimal cerumen.   Right tympanic membrane clear and mobile without evidence of perforation, retraction or middle ear effusion.   Left tympanic membrane clear and mobile without evidence of perforation, retraction or middle ear effusion.     NOSE:   No external deformity. No external nasal lesions, lacerations, or scars. Nasal tip symmetrical with normal nasal valves.   Nasal cavity with mild deviated   septum, edematous/erythematous mucosa and turbinates. Mcuosal inflammation on anterior septum L>R.  No  , masses, purulence or polyps.     OC/OP:   Mucous membranes moist, no masses, lesions or exudates.   Normal tongue, floor of mouth, teeth, gums, lips. Cobblestoning of  posterior pharyngeal wall.         NECK:   No neck masses or thyroid enlargement. Trachea midline. No tenderness to  palpation    LYMPHATIC:   No cervical lymphadenopathy.     RESPIRATORY:   Symmetric chest elevation & no retractions. No significant hoarseness. No increased work of breathing.    CV:   No clubbing or cyanosis. No obvious edema    Skin:   No facial rashes, vesicles or lesions.     Extremities:   No gross abnormalities      Clinic Procedure    Nasal Endoscopy Laryngoscopy Nasophrayngosocopy   Procedure: flexible fiberoptic laryngoscopy, nasopharyngoscopy.   Flexible Fiberoptic Laryngoscopy Indication:   inability to tolerate mirror exam     Risks, benefits, and alternatives, infection risk, bleeding risk and risk of mucosal trauma and pain were discussed with the patient. Verbal consent was obtained prior to the procedure and is detailed in the patient's record.     Procedure Note:      With the patient seated in the exam chair, the bilateral nasal cavity was topically treated with 4% lidocaine and phenylephrine.  The bilateral nasal cavity was intubated with the flexible laryngoscope.   Nasal Endoscopy: Nasal endoscopy was successfully completed using the endoscope and the nasal mucosa, septum, turbinates, and osteomeatal complex were examined.  Nasopharyngoscopy: Nasopharyngoscopy was successfully completed using the endoscope and the nasal mucosa, septum, turbinates, osteomeatal complex, and nasopharynx were examined.   Laryngoscopy: Laryngoscopy was successfully completed using the flexible laryngoscope and the nasopharynx, hypopharynx, and larynx were examined.  Patient Status: the patient tolerated the procedure well.   Complications: there were no complications.      . After obtaining adequate decongestion and anesthesia, the scope was introduced into the floor of the nasal cavity. The septum, inferior, and middle turbinates were without any mucosal lesions.  The Fossa of [place] were clear bilaterally, and good velopharyngeal closure was obtained on phonation.  Base of tongue, tonsillar complex, and  posterior pharyngeal wall free of asymmetry or concerning ulcerations or masses.  supraglottic segments with edema, pachydermia and erythema at the esophageal opening and posterior supraglottis.  scattered mucous debris.  No mucosal ulcerations or masses.  True vocal cords abduct and adduct normally with no mucosal or mass lesions.  No masses visualized in the pyriform recesses.  The scope was removed and patient tolerated the procedure well.      Information review:  External sources (notes, imaging, lab results) listed below personally reviewed to aid in medical decision making process.  -PCM 12/12/24  -UC 12/24/24  -PT 12/4/24         [1]   Family History  Problem Relation Name Age of Onset    Diabetes Father Jaylon Santiago     Heart disease Mother Raeeusebio MontemayorAgustin     Rashes / Skin problems Father Jaylon Santiago     Hearing loss Father Jaylon Santiago 60 - 69    Cancer Paternal Grandmother      Cancer Mother's Sister Jan 60 - 69

## 2025-08-01 DIAGNOSIS — J32.9 CHRONIC SINUSITIS, UNSPECIFIED LOCATION: Primary | ICD-10-CM

## 2025-08-01 RX ORDER — CLARITHROMYCIN 500 MG/1
1000 TABLET, FILM COATED, EXTENDED RELEASE ORAL DAILY
Qty: 20 TABLET | Refills: 0 | Status: SHIPPED | OUTPATIENT
Start: 2025-08-01 | End: 2025-08-13

## 2025-08-06 ENCOUNTER — APPOINTMENT (OUTPATIENT)
Dept: RADIOLOGY | Facility: HOSPITAL | Age: 45
End: 2025-08-06
Payer: COMMERCIAL

## 2025-08-06 DIAGNOSIS — J32.9 CHRONIC SINUSITIS, UNSPECIFIED LOCATION: ICD-10-CM

## 2025-08-06 LAB
THYROPEROXIDASE AB SERPL-ACNC: <1 IU/ML
TSH SERPL-ACNC: 1.73 MIU/L

## 2025-08-06 PROCEDURE — 70486 CT MAXILLOFACIAL W/O DYE: CPT

## 2025-08-13 DIAGNOSIS — E07.9 THYROID CONDITION: Primary | ICD-10-CM

## 2025-09-05 ENCOUNTER — TELEPHONE (OUTPATIENT)
Dept: SLEEP MEDICINE | Facility: HOSPITAL | Age: 45
End: 2025-09-05
Payer: COMMERCIAL

## 2025-09-18 ENCOUNTER — APPOINTMENT (OUTPATIENT)
Dept: OTOLARYNGOLOGY | Facility: CLINIC | Age: 45
End: 2025-09-18
Payer: COMMERCIAL

## 2025-10-30 ENCOUNTER — APPOINTMENT (OUTPATIENT)
Dept: PRIMARY CARE | Facility: CLINIC | Age: 45
End: 2025-10-30
Payer: COMMERCIAL